# Patient Record
Sex: MALE | Race: WHITE | NOT HISPANIC OR LATINO | Employment: FULL TIME | URBAN - METROPOLITAN AREA
[De-identification: names, ages, dates, MRNs, and addresses within clinical notes are randomized per-mention and may not be internally consistent; named-entity substitution may affect disease eponyms.]

---

## 2024-01-17 ENCOUNTER — APPOINTMENT (OUTPATIENT)
Dept: RADIOLOGY | Facility: HOSPITAL | Age: 49
End: 2024-01-17
Payer: COMMERCIAL

## 2024-01-17 ENCOUNTER — APPOINTMENT (OUTPATIENT)
Dept: CARDIOLOGY | Facility: HOSPITAL | Age: 49
End: 2024-01-17
Payer: COMMERCIAL

## 2024-01-17 ENCOUNTER — HOSPITAL ENCOUNTER (OUTPATIENT)
Facility: HOSPITAL | Age: 49
Setting detail: OBSERVATION
Discharge: HOME | End: 2024-01-18
Attending: GENERAL PRACTICE | Admitting: INTERNAL MEDICINE
Payer: COMMERCIAL

## 2024-01-17 DIAGNOSIS — R09.02 HYPOXIA: Primary | ICD-10-CM

## 2024-01-17 DIAGNOSIS — J18.9 PNEUMONIA OF BOTH LUNGS DUE TO INFECTIOUS ORGANISM, UNSPECIFIED PART OF LUNG: ICD-10-CM

## 2024-01-17 PROBLEM — F10.90 ALCOHOL USE DISORDER: Status: ACTIVE | Noted: 2024-01-17

## 2024-01-17 PROBLEM — B34.9 NONSPECIFIC SYNDROME SUGGESTIVE OF VIRAL ILLNESS: Status: ACTIVE | Noted: 2024-01-17

## 2024-01-17 PROBLEM — J96.01 ACUTE RESPIRATORY FAILURE WITH HYPOXIA (MULTI): Status: ACTIVE | Noted: 2024-01-17

## 2024-01-17 PROBLEM — R05.9 COUGH: Status: ACTIVE | Noted: 2024-01-17

## 2024-01-17 LAB
ALBUMIN SERPL BCP-MCNC: 4.1 G/DL (ref 3.4–5)
ALP SERPL-CCNC: 70 U/L (ref 33–120)
ALT SERPL W P-5'-P-CCNC: 20 U/L (ref 10–52)
ANION GAP SERPL CALC-SCNC: 15 MMOL/L (ref 10–20)
AST SERPL W P-5'-P-CCNC: 15 U/L (ref 9–39)
BASOPHILS # BLD AUTO: 0.03 X10*3/UL (ref 0–0.1)
BASOPHILS NFR BLD AUTO: 0.3 %
BILIRUB SERPL-MCNC: 1.3 MG/DL (ref 0–1.2)
BNP SERPL-MCNC: 30 PG/ML (ref 0–99)
BUN SERPL-MCNC: 8 MG/DL (ref 6–23)
CALCIUM SERPL-MCNC: 9.3 MG/DL (ref 8.6–10.3)
CARDIAC TROPONIN I PNL SERPL HS: <3 NG/L (ref 0–20)
CHLORIDE SERPL-SCNC: 103 MMOL/L (ref 98–107)
CO2 SERPL-SCNC: 25 MMOL/L (ref 21–32)
CREAT SERPL-MCNC: 0.86 MG/DL (ref 0.5–1.3)
D DIMER PPP FEU-MCNC: 342 NG/ML FEU
EGFRCR SERPLBLD CKD-EPI 2021: >90 ML/MIN/1.73M*2
EOSINOPHIL # BLD AUTO: 0.25 X10*3/UL (ref 0–0.7)
EOSINOPHIL NFR BLD AUTO: 2.8 %
ERYTHROCYTE [DISTWIDTH] IN BLOOD BY AUTOMATED COUNT: 12.8 % (ref 11.5–14.5)
ETHANOL SERPL-MCNC: <10 MG/DL
FLUAV RNA RESP QL NAA+PROBE: NOT DETECTED
FLUBV RNA RESP QL NAA+PROBE: NOT DETECTED
GLUCOSE SERPL-MCNC: 131 MG/DL (ref 74–99)
HCT VFR BLD AUTO: 46.5 % (ref 41–52)
HGB BLD-MCNC: 15 G/DL (ref 13.5–17.5)
IMM GRANULOCYTES # BLD AUTO: 0.01 X10*3/UL (ref 0–0.7)
IMM GRANULOCYTES NFR BLD AUTO: 0.1 % (ref 0–0.9)
LYMPHOCYTES # BLD AUTO: 2.1 X10*3/UL (ref 1.2–4.8)
LYMPHOCYTES NFR BLD AUTO: 23.9 %
MAGNESIUM SERPL-MCNC: 2.4 MG/DL (ref 1.6–2.4)
MCH RBC QN AUTO: 28.5 PG (ref 26–34)
MCHC RBC AUTO-ENTMCNC: 32.3 G/DL (ref 32–36)
MCV RBC AUTO: 88 FL (ref 80–100)
MONOCYTES # BLD AUTO: 0.95 X10*3/UL (ref 0.1–1)
MONOCYTES NFR BLD AUTO: 10.8 %
NEUTROPHILS # BLD AUTO: 5.46 X10*3/UL (ref 1.2–7.7)
NEUTROPHILS NFR BLD AUTO: 62.1 %
NRBC BLD-RTO: 0 /100 WBCS (ref 0–0)
PLATELET # BLD AUTO: 242 X10*3/UL (ref 150–450)
POTASSIUM SERPL-SCNC: 3.6 MMOL/L (ref 3.5–5.3)
PROT SERPL-MCNC: 7.5 G/DL (ref 6.4–8.2)
RBC # BLD AUTO: 5.26 X10*6/UL (ref 4.5–5.9)
RSV RNA RESP QL NAA+PROBE: NOT DETECTED
SARS-COV-2 RNA RESP QL NAA+PROBE: NOT DETECTED
SODIUM SERPL-SCNC: 139 MMOL/L (ref 136–145)
WBC # BLD AUTO: 8.8 X10*3/UL (ref 4.4–11.3)

## 2024-01-17 PROCEDURE — 85025 COMPLETE CBC W/AUTO DIFF WBC: CPT | Performed by: PHYSICIAN ASSISTANT

## 2024-01-17 PROCEDURE — 96375 TX/PRO/DX INJ NEW DRUG ADDON: CPT

## 2024-01-17 PROCEDURE — 71046 X-RAY EXAM CHEST 2 VIEWS: CPT | Performed by: RADIOLOGY

## 2024-01-17 PROCEDURE — 2500000002 HC RX 250 W HCPCS SELF ADMINISTERED DRUGS (ALT 637 FOR MEDICARE OP, ALT 636 FOR OP/ED): Performed by: PHYSICIAN ASSISTANT

## 2024-01-17 PROCEDURE — 96365 THER/PROPH/DIAG IV INF INIT: CPT

## 2024-01-17 PROCEDURE — 83880 ASSAY OF NATRIURETIC PEPTIDE: CPT | Performed by: PHYSICIAN ASSISTANT

## 2024-01-17 PROCEDURE — G0378 HOSPITAL OBSERVATION PER HR: HCPCS

## 2024-01-17 PROCEDURE — 2500000004 HC RX 250 GENERAL PHARMACY W/ HCPCS (ALT 636 FOR OP/ED): Performed by: PHYSICIAN ASSISTANT

## 2024-01-17 PROCEDURE — 80053 COMPREHEN METABOLIC PANEL: CPT | Performed by: PHYSICIAN ASSISTANT

## 2024-01-17 PROCEDURE — 85379 FIBRIN DEGRADATION QUANT: CPT | Performed by: PHYSICIAN ASSISTANT

## 2024-01-17 PROCEDURE — 84484 ASSAY OF TROPONIN QUANT: CPT | Performed by: PHYSICIAN ASSISTANT

## 2024-01-17 PROCEDURE — 99223 1ST HOSP IP/OBS HIGH 75: CPT | Performed by: PHYSICIAN ASSISTANT

## 2024-01-17 PROCEDURE — 87040 BLOOD CULTURE FOR BACTERIA: CPT | Mod: AHULAB | Performed by: PHYSICIAN ASSISTANT

## 2024-01-17 PROCEDURE — 71046 X-RAY EXAM CHEST 2 VIEWS: CPT

## 2024-01-17 PROCEDURE — 83735 ASSAY OF MAGNESIUM: CPT | Performed by: PHYSICIAN ASSISTANT

## 2024-01-17 PROCEDURE — 82077 ASSAY SPEC XCP UR&BREATH IA: CPT | Performed by: PHYSICIAN ASSISTANT

## 2024-01-17 PROCEDURE — 36415 COLL VENOUS BLD VENIPUNCTURE: CPT | Performed by: PHYSICIAN ASSISTANT

## 2024-01-17 PROCEDURE — 94640 AIRWAY INHALATION TREATMENT: CPT

## 2024-01-17 PROCEDURE — 94667 MNPJ CHEST WALL 1ST: CPT

## 2024-01-17 PROCEDURE — 93005 ELECTROCARDIOGRAM TRACING: CPT

## 2024-01-17 PROCEDURE — 96367 TX/PROPH/DG ADDL SEQ IV INF: CPT

## 2024-01-17 PROCEDURE — 99285 EMERGENCY DEPT VISIT HI MDM: CPT | Performed by: GENERAL PRACTICE

## 2024-01-17 PROCEDURE — 87637 SARSCOV2&INF A&B&RSV AMP PRB: CPT | Performed by: PHYSICIAN ASSISTANT

## 2024-01-17 PROCEDURE — 2500000005 HC RX 250 GENERAL PHARMACY W/O HCPCS: Performed by: PHYSICIAN ASSISTANT

## 2024-01-17 RX ORDER — ACETAMINOPHEN 160 MG/5ML
975 SOLUTION ORAL EVERY 6 HOURS PRN
Status: DISCONTINUED | OUTPATIENT
Start: 2024-01-17 | End: 2024-01-18 | Stop reason: HOSPADM

## 2024-01-17 RX ORDER — CEFTRIAXONE 1 G/50ML
1 INJECTION, SOLUTION INTRAVENOUS ONCE
Status: COMPLETED | OUTPATIENT
Start: 2024-01-17 | End: 2024-01-17

## 2024-01-17 RX ORDER — ENOXAPARIN SODIUM 100 MG/ML
60 INJECTION SUBCUTANEOUS EVERY 12 HOURS SCHEDULED
Status: DISCONTINUED | OUTPATIENT
Start: 2024-01-17 | End: 2024-01-18 | Stop reason: HOSPADM

## 2024-01-17 RX ORDER — IPRATROPIUM BROMIDE AND ALBUTEROL SULFATE 2.5; .5 MG/3ML; MG/3ML
3 SOLUTION RESPIRATORY (INHALATION) ONCE
Status: COMPLETED | OUTPATIENT
Start: 2024-01-17 | End: 2024-01-17

## 2024-01-17 RX ORDER — ACETAMINOPHEN 325 MG/1
950 TABLET ORAL EVERY 6 HOURS PRN
Status: DISCONTINUED | OUTPATIENT
Start: 2024-01-17 | End: 2024-01-18 | Stop reason: HOSPADM

## 2024-01-17 RX ORDER — IPRATROPIUM BROMIDE AND ALBUTEROL SULFATE 2.5; .5 MG/3ML; MG/3ML
3 SOLUTION RESPIRATORY (INHALATION)
Status: COMPLETED | OUTPATIENT
Start: 2024-01-17 | End: 2024-01-17

## 2024-01-17 RX ORDER — GUAIFENESIN/DEXTROMETHORPHAN 100-10MG/5
5 SYRUP ORAL EVERY 4 HOURS PRN
Status: DISCONTINUED | OUTPATIENT
Start: 2024-01-17 | End: 2024-01-18 | Stop reason: HOSPADM

## 2024-01-17 RX ORDER — TALC
3 POWDER (GRAM) TOPICAL
Status: DISCONTINUED | OUTPATIENT
Start: 2024-01-18 | End: 2024-01-18 | Stop reason: HOSPADM

## 2024-01-17 RX ORDER — ACETAMINOPHEN 650 MG/1
650 SUPPOSITORY RECTAL EVERY 4 HOURS PRN
Status: DISCONTINUED | OUTPATIENT
Start: 2024-01-17 | End: 2024-01-18 | Stop reason: HOSPADM

## 2024-01-17 RX ORDER — MAGNESIUM SULFATE HEPTAHYDRATE 40 MG/ML
2 INJECTION, SOLUTION INTRAVENOUS ONCE
Status: COMPLETED | OUTPATIENT
Start: 2024-01-17 | End: 2024-01-17

## 2024-01-17 RX ORDER — PANTOPRAZOLE SODIUM 40 MG/1
40 TABLET, DELAYED RELEASE ORAL
Status: DISCONTINUED | OUTPATIENT
Start: 2024-01-18 | End: 2024-01-18 | Stop reason: HOSPADM

## 2024-01-17 RX ORDER — DOCUSATE SODIUM 100 MG/1
100 CAPSULE, LIQUID FILLED ORAL 2 TIMES DAILY PRN
Status: DISCONTINUED | OUTPATIENT
Start: 2024-01-17 | End: 2024-01-18 | Stop reason: HOSPADM

## 2024-01-17 RX ORDER — PANTOPRAZOLE SODIUM 40 MG/10ML
40 INJECTION, POWDER, LYOPHILIZED, FOR SOLUTION INTRAVENOUS
Status: DISCONTINUED | OUTPATIENT
Start: 2024-01-18 | End: 2024-01-18 | Stop reason: HOSPADM

## 2024-01-17 RX ORDER — IPRATROPIUM BROMIDE AND ALBUTEROL SULFATE 2.5; .5 MG/3ML; MG/3ML
3 SOLUTION RESPIRATORY (INHALATION) EVERY 4 HOURS PRN
Status: DISCONTINUED | OUTPATIENT
Start: 2024-01-17 | End: 2024-01-18 | Stop reason: HOSPADM

## 2024-01-17 RX ORDER — GUAIFENESIN 600 MG/1
600 TABLET, EXTENDED RELEASE ORAL 2 TIMES DAILY
Status: DISCONTINUED | OUTPATIENT
Start: 2024-01-17 | End: 2024-01-18 | Stop reason: HOSPADM

## 2024-01-17 RX ADMIN — CEFTRIAXONE SODIUM 1 G: 1 INJECTION, SOLUTION INTRAVENOUS at 16:33

## 2024-01-17 RX ADMIN — ENOXAPARIN SODIUM 60 MG: 60 INJECTION SUBCUTANEOUS at 21:09

## 2024-01-17 RX ADMIN — IPRATROPIUM BROMIDE AND ALBUTEROL SULFATE 3 ML: 2.5; .5 SOLUTION RESPIRATORY (INHALATION) at 15:19

## 2024-01-17 RX ADMIN — Medication 2 L/MIN: at 21:07

## 2024-01-17 RX ADMIN — IPRATROPIUM BROMIDE AND ALBUTEROL SULFATE 3 ML: 2.5; .5 SOLUTION RESPIRATORY (INHALATION) at 15:25

## 2024-01-17 RX ADMIN — IPRATROPIUM BROMIDE AND ALBUTEROL SULFATE 3 ML: 2.5; .5 SOLUTION RESPIRATORY (INHALATION) at 17:17

## 2024-01-17 RX ADMIN — MAGNESIUM SULFATE IN WATER 2 G: 2 INJECTION, SOLUTION INTRAVENOUS at 17:02

## 2024-01-17 RX ADMIN — IPRATROPIUM BROMIDE AND ALBUTEROL SULFATE 3 ML: 2.5; .5 SOLUTION RESPIRATORY (INHALATION) at 15:21

## 2024-01-17 RX ADMIN — AZITHROMYCIN MONOHYDRATE 500 MG: 500 INJECTION, POWDER, LYOPHILIZED, FOR SOLUTION INTRAVENOUS at 17:46

## 2024-01-17 RX ADMIN — METHYLPREDNISOLONE SODIUM SUCCINATE 125 MG: 125 INJECTION, POWDER, FOR SOLUTION INTRAMUSCULAR; INTRAVENOUS at 15:10

## 2024-01-17 SDOH — SOCIAL STABILITY: SOCIAL INSECURITY: ARE THERE ANY APPARENT SIGNS OF INJURIES/BEHAVIORS THAT COULD BE RELATED TO ABUSE/NEGLECT?: NO

## 2024-01-17 SDOH — SOCIAL STABILITY: SOCIAL INSECURITY: DO YOU FEEL UNSAFE GOING BACK TO THE PLACE WHERE YOU ARE LIVING?: NO

## 2024-01-17 SDOH — SOCIAL STABILITY: SOCIAL INSECURITY: DOES ANYONE TRY TO KEEP YOU FROM HAVING/CONTACTING OTHER FRIENDS OR DOING THINGS OUTSIDE YOUR HOME?: NO

## 2024-01-17 SDOH — SOCIAL STABILITY: SOCIAL INSECURITY: WERE YOU ABLE TO COMPLETE ALL THE BEHAVIORAL HEALTH SCREENINGS?: YES

## 2024-01-17 SDOH — SOCIAL STABILITY: SOCIAL INSECURITY: HAS ANYONE EVER THREATENED TO HURT YOUR FAMILY OR YOUR PETS?: NO

## 2024-01-17 SDOH — SOCIAL STABILITY: SOCIAL INSECURITY: ABUSE: ADULT

## 2024-01-17 SDOH — SOCIAL STABILITY: SOCIAL INSECURITY: DO YOU FEEL ANYONE HAS EXPLOITED OR TAKEN ADVANTAGE OF YOU FINANCIALLY OR OF YOUR PERSONAL PROPERTY?: NO

## 2024-01-17 SDOH — SOCIAL STABILITY: SOCIAL INSECURITY: HAVE YOU HAD THOUGHTS OF HARMING ANYONE ELSE?: NO

## 2024-01-17 SDOH — SOCIAL STABILITY: SOCIAL INSECURITY: ARE YOU OR HAVE YOU BEEN THREATENED OR ABUSED PHYSICALLY, EMOTIONALLY, OR SEXUALLY BY ANYONE?: NO

## 2024-01-17 ASSESSMENT — ACTIVITIES OF DAILY LIVING (ADL)
HEARING - RIGHT EAR: FUNCTIONAL
PATIENT'S MEMORY ADEQUATE TO SAFELY COMPLETE DAILY ACTIVITIES?: YES
HEARING - LEFT EAR: FUNCTIONAL
TOILETING: INDEPENDENT
BATHING: INDEPENDENT
DRESSING YOURSELF: INDEPENDENT
ADEQUATE_TO_COMPLETE_ADL: YES
FEEDING YOURSELF: INDEPENDENT
JUDGMENT_ADEQUATE_SAFELY_COMPLETE_DAILY_ACTIVITIES: YES
LACK_OF_TRANSPORTATION: NO
WALKS IN HOME: INDEPENDENT
ASSISTIVE_DEVICE: EYEGLASSES
GROOMING: INDEPENDENT

## 2024-01-17 ASSESSMENT — PATIENT HEALTH QUESTIONNAIRE - PHQ9
1. LITTLE INTEREST OR PLEASURE IN DOING THINGS: NOT AT ALL
2. FEELING DOWN, DEPRESSED OR HOPELESS: NOT AT ALL
SUM OF ALL RESPONSES TO PHQ9 QUESTIONS 1 & 2: 0

## 2024-01-17 ASSESSMENT — ENCOUNTER SYMPTOMS
CHILLS: 0
DIARRHEA: 0
MYALGIAS: 0
DYSURIA: 0
HEMATURIA: 0
VOMITING: 0
CONSTIPATION: 0
WHEEZING: 1
SHORTNESS OF BREATH: 1
FEVER: 0
SORE THROAT: 1
COUGH: 1
CHEST TIGHTNESS: 0
CONFUSION: 0
NAUSEA: 0
HALLUCINATIONS: 0
DIZZINESS: 0
EYE PAIN: 0
HEADACHES: 1

## 2024-01-17 ASSESSMENT — LIFESTYLE VARIABLES
HAVE YOU OR SOMEONE ELSE BEEN INJURED AS A RESULT OF YOUR DRINKING: NO
AUDIT-C TOTAL SCORE: 10
HOW OFTEN DURING THE LAST YEAR HAVE YOU NEEDED AN ALCOHOLIC DRINK FIRST THING IN THE MORNING TO GET YOURSELF GOING AFTER A NIGHT OF HEAVY DRINKING: NEVER
SKIP TO QUESTIONS 9-10: 0
HAS A RELATIVE, FRIEND, DOCTOR, OR ANOTHER HEALTH PROFESSIONAL EXPRESSED CONCERN ABOUT YOUR DRINKING OR SUGGESTED YOU CUT DOWN: YES, BUT NOT IN THE LAST YEAR
PRESCIPTION_ABUSE_PAST_12_MONTHS: NO
HOW OFTEN DURING THE LAST YEAR HAVE YOU HAD A FEELING OF GUILT OR REMORSE AFTER DRINKING: NEVER
HOW OFTEN DO YOU HAVE 6 OR MORE DRINKS ON ONE OCCASION: WEEKLY
AUDIT TOTAL SCORE: -1
AUDIT TOTAL SCORE: 2
AUDIT-C TOTAL SCORE: 10
HOW OFTEN DURING THE LAST YEAR HAVE YOU BEEN UNABLE TO REMEMBER WHAT HAPPENED THE NIGHT BEFORE BECAUSE YOU HAD BEEN DRINKING: NEVER
HOW MANY STANDARD DRINKS CONTAINING ALCOHOL DO YOU HAVE ON A TYPICAL DAY: 7 TO 9
SUBSTANCE_ABUSE_PAST_12_MONTHS: NO
HOW OFTEN DO YOU HAVE A DRINK CONTAINING ALCOHOL: 4 OR MORE TIMES A WEEK
HOW OFTEN DURING THE LAST YEAR HAVE YOU FAILED TO DO WHAT WAS NORMALLY EXPECTED FROM YOU BECAUSE OF DRINKING: NEVER

## 2024-01-17 ASSESSMENT — COGNITIVE AND FUNCTIONAL STATUS - GENERAL
PATIENT BASELINE BEDBOUND: NO
MOBILITY SCORE: 24
DAILY ACTIVITIY SCORE: 24

## 2024-01-17 ASSESSMENT — COLUMBIA-SUICIDE SEVERITY RATING SCALE - C-SSRS
2. HAVE YOU ACTUALLY HAD ANY THOUGHTS OF KILLING YOURSELF?: NO
1. IN THE PAST MONTH, HAVE YOU WISHED YOU WERE DEAD OR WISHED YOU COULD GO TO SLEEP AND NOT WAKE UP?: NO
6. HAVE YOU EVER DONE ANYTHING, STARTED TO DO ANYTHING, OR PREPARED TO DO ANYTHING TO END YOUR LIFE?: NO

## 2024-01-17 ASSESSMENT — PAIN - FUNCTIONAL ASSESSMENT: PAIN_FUNCTIONAL_ASSESSMENT: 0-10

## 2024-01-17 ASSESSMENT — PAIN SCALES - GENERAL: PAINLEVEL_OUTOF10: 0 - NO PAIN

## 2024-01-17 NOTE — H&P
History Of Present Illness  Rob Carlson is a 48 y.o. male with PMH significant for THU, alcohol use disorder, obesity, venous insufficiency with chronic ulcers who presented to the ED with cough, sore throat and flulike symptoms for the past 4 days. He also reports SOB for the past month and states he can only walk about 60 ft before getting short of breath.  Denies any fever, chills, CP, N/V/D, dizziness. He admits to heavy alcohol use daily. Denies h/o smoking or illicit drug use. Denies h/o DVT/PE, CVA/TIA, acute MI.     ED course: EKG showed NSR, no ischemic ST changes. Negative COVID/Influenza/RSV PCR. Lab work showed CBC without leukocytosis, CMP unremarkable. D-dimer normal. HS troponin normal. Blood cultures pending. CXR showed diffuse interstitial infiltrates bilaterally. Received IV Solu-medrol and DuoNebs x3 with improvement in his symptoms but continued wheezing heard on exam. Given IV azithromycin 500mg and IV ceftriaxone 1g for suspected pneumonia. Pt unable to maintain adequate oxygen saturations on room air and was admitted to observation.       Past Medical History  History reviewed. No pertinent past medical history.    Surgical History  History reviewed. No pertinent surgical history.    Social History  Social History     Socioeconomic History    Marital status: Single     Spouse name: None    Number of children: None    Years of education: None    Highest education level: None   Occupational History    None   Tobacco Use    Smoking status: Never    Smokeless tobacco: Never   Vaping Use    Vaping Use: None   Substance and Sexual Activity    Alcohol use: Yes     Alcohol/week: 6.0 standard drinks of alcohol     Types: 6 Cans of beer per week     Comment: 5th on fri and sat    Drug use: Never    Sexual activity: None   Other Topics Concern    None   Social History Narrative    None     Social Determinants of Health     Financial Resource Strain: Low Risk  (1/17/2024)    Overall Financial Resource  Strain (CARDIA)     Difficulty of Paying Living Expenses: Not hard at all   Food Insecurity: Not on file   Transportation Needs: No Transportation Needs (1/17/2024)    PRAPARE - Transportation     Lack of Transportation (Medical): No     Lack of Transportation (Non-Medical): No   Physical Activity: Not on file   Stress: Not on file   Social Connections: Not on file   Intimate Partner Violence: Not on file   Housing Stability: Low Risk  (1/17/2024)    Housing Stability Vital Sign     Unable to Pay for Housing in the Last Year: No     Number of Places Lived in the Last Year: 1     Unstable Housing in the Last Year: No        Family History  No family history on file.     Allergies  Allergies as of 01/17/2024 - Reviewed 01/17/2024   Allergen Reaction Noted    Bactrim [sulfamethoxazole-trimethoprim] Rash 01/17/2024        Review of Systems  Review of Systems   Constitutional:  Negative for chills and fever.   HENT:  Positive for sneezing and sore throat.    Eyes:  Negative for pain.   Respiratory:  Positive for cough, shortness of breath and wheezing. Negative for chest tightness.    Cardiovascular:  Positive for leg swelling. Negative for chest pain.   Gastrointestinal:  Negative for constipation, diarrhea, nausea and vomiting.   Genitourinary:  Negative for dysuria and hematuria.   Musculoskeletal:  Negative for myalgias.   Skin:  Negative for rash.   Neurological:  Positive for headaches. Negative for dizziness.   Psychiatric/Behavioral:  Negative for confusion and hallucinations.        Relevant results reviewed   PROVIDER notes  Nursing notes  MEDS:  Current Facility-Administered Medications   Medication Dose Route Frequency Provider Last Rate Last Admin    acetaminophen (Tylenol) tablet 975 mg  975 mg oral q6h PRN Sil Horner PA-C        Or    acetaminophen (Tylenol) oral liquid 1,000 mg  1,000 mg oral q6h PRN Sil Horner PA-C        Or    acetaminophen (Tylenol) suppository 650 mg  650 mg rectal q4h PRN  Sil Horner PA-C        azithromycin (Zithromax) in dextrose 5 % in water (D5W) 250 mL  mg  500 mg intravenous q24h Sil Horner PA-C        cefTRIAXone (Rocephin) in dextrose 5 % water (D5W) 50 mL IV 2 g  2 g intravenous q24h Sli Horner PA-C        dextromethorphan-guaifenesin (Robitussin DM)  mg/5 mL oral liquid 5 mL  5 mL oral q4h PRN Sil Horner PA-C        docusate sodium (Colace) capsule 100 mg  100 mg oral BID PRN Sil Horner PA-C        enoxaparin (Lovenox) syringe 60 mg  60 mg subcutaneous q12h BRENT Sil Horner PA-C   60 mg at 01/17/24 2109    guaiFENesin (Mucinex) 12 hr tablet 600 mg  600 mg oral BID Sil Horner PA-C        ipratropium-albuteroL (Duo-Neb) 0.5-2.5 mg/3 mL nebulizer solution 3 mL  3 mL nebulization q4h PRN Sil Horner PA-C        melatonin tablet 3 mg  3 mg oral Daily Sil Horner PA-C        oxygen (O2) therapy   inhalation Continuous PRN - O2/gases Anu Valdivia PA-C   2 L/min at 01/17/24 2107    pantoprazole (ProtoNix) EC tablet 40 mg  40 mg oral Daily before breakfast Sil Horner PA-C        Or    pantoprazole (ProtoNix) injection 40 mg  40 mg intravenous Daily before breakfast Sil Horner PA-C          LABS:  Results for orders placed or performed during the hospital encounter of 01/17/24 (from the past 24 hour(s))   Sars-CoV-2 and Influenza A/B PCR   Result Value Ref Range    Flu A Result Not Detected Not Detected    Flu B Result Not Detected Not Detected    Coronavirus 2019, PCR Not Detected Not Detected   RSV PCR   Result Value Ref Range    RSV PCR Not Detected Not Detected   ECG 12 Lead   Result Value Ref Range    Ventricular Rate 90 BPM    Atrial Rate 90 BPM    FL Interval 134 ms    QRS Duration 82 ms    QT Interval 370 ms    QTC Calculation(Bazett) 452 ms    P Axis 39 degrees    R Axis 37 degrees    T Axis -14 degrees    QRS Count 15 beats    Q Onset 218 ms    P Onset 151 ms    P Offset 201 ms    T Offset 403 ms     QTC Fredericia 423 ms   CBC and Auto Differential   Result Value Ref Range    WBC 8.8 4.4 - 11.3 x10*3/uL    nRBC 0.0 0.0 - 0.0 /100 WBCs    RBC 5.26 4.50 - 5.90 x10*6/uL    Hemoglobin 15.0 13.5 - 17.5 g/dL    Hematocrit 46.5 41.0 - 52.0 %    MCV 88 80 - 100 fL    MCH 28.5 26.0 - 34.0 pg    MCHC 32.3 32.0 - 36.0 g/dL    RDW 12.8 11.5 - 14.5 %    Platelets 242 150 - 450 x10*3/uL    Neutrophils % 62.1 40.0 - 80.0 %    Immature Granulocytes %, Automated 0.1 0.0 - 0.9 %    Lymphocytes % 23.9 13.0 - 44.0 %    Monocytes % 10.8 2.0 - 10.0 %    Eosinophils % 2.8 0.0 - 6.0 %    Basophils % 0.3 0.0 - 2.0 %    Neutrophils Absolute 5.46 1.20 - 7.70 x10*3/uL    Immature Granulocytes Absolute, Automated 0.01 0.00 - 0.70 x10*3/uL    Lymphocytes Absolute 2.10 1.20 - 4.80 x10*3/uL    Monocytes Absolute 0.95 0.10 - 1.00 x10*3/uL    Eosinophils Absolute 0.25 0.00 - 0.70 x10*3/uL    Basophils Absolute 0.03 0.00 - 0.10 x10*3/uL   Comprehensive metabolic panel   Result Value Ref Range    Glucose 131 (H) 74 - 99 mg/dL    Sodium 139 136 - 145 mmol/L    Potassium 3.6 3.5 - 5.3 mmol/L    Chloride 103 98 - 107 mmol/L    Bicarbonate 25 21 - 32 mmol/L    Anion Gap 15 10 - 20 mmol/L    Urea Nitrogen 8 6 - 23 mg/dL    Creatinine 0.86 0.50 - 1.30 mg/dL    eGFR >90 >60 mL/min/1.73m*2    Calcium 9.3 8.6 - 10.3 mg/dL    Albumin 4.1 3.4 - 5.0 g/dL    Alkaline Phosphatase 70 33 - 120 U/L    Total Protein 7.5 6.4 - 8.2 g/dL    AST 15 9 - 39 U/L    Bilirubin, Total 1.3 (H) 0.0 - 1.2 mg/dL    ALT 20 10 - 52 U/L   D-dimer, VTE Exclusion   Result Value Ref Range    D-Dimer, Quantitative VTE Exclusion 342 <=500 ng/mL FEU   Blood Culture    Specimen: Peripheral Venipuncture; Blood culture   Result Value Ref Range    Blood Culture Loaded on Instrument - Culture in progress    Blood Culture    Specimen: Peripheral Venipuncture; Blood culture   Result Value Ref Range    Blood Culture Loaded on Instrument - Culture in progress    B-Type Natriuretic Peptide    Result Value Ref Range    BNP 30 0 - 99 pg/mL   Ethanol   Result Value Ref Range    Alcohol <10 <=10 mg/dL   Troponin I, High Sensitivity   Result Value Ref Range    Troponin I, High Sensitivity <3 0 - 20 ng/L   Magnesium   Result Value Ref Range    Magnesium 2.40 1.60 - 2.40 mg/dL      IMAGING:  XR chest 2 views   Final Result   Diffuse interstitial infiltrates bilaterally, as above. Clinical   correlation and continued follow-up until clearing is recommended.        MACRO:   None.        Signed by: Young Conway 1/17/2024 3:14 PM   Dictation workstation:   [id number]             PHYSICAL EXAM  /84 (BP Location: Right arm, Patient Position: Lying)   Pulse 76   Temp 36.5 °C (97.7 °F) (Oral)   Resp 15   Ht 1.829 m (6')   Wt (!) 186 kg (410 lb)   SpO2 95%   BMI 55.61 kg/m²   PHYSICAL EXAM:  GENERAL: Alert, NAD, cooperative  SKIN: Warm and dry. Chronic venous ulcers present on anterior shins bilaterally - wounds are dressed and were not removed during exam.  HEENT:  NCAT, PERRLA, EOMI, nonicteric sclera, MMM, neck supple, trachea midline  LUNGS: Diffuse expiratory wheezing  CARDS: RRR, no m/g/r appreciated  GI: Soft, NTND, BS+, no rebound, no guarding   MS/Extremities: WWP, 3+ pitting edema bilaterally, distal pulses intact, moves all extremities  NEURO: A&Ox3, grossly nonfocal exam, speech fluent, follows commands, answers questions appropriately  PSYCH: mood and behavior appropriate    Assessment/Plan:   Principal Problem:    Pneumonia of both lungs due to infectious organism, unspecified part of lung  Active Problems:    Acute respiratory failure with hypoxia (CMS/HCC)    Alcohol use disorder     Pneumonia  Hypoxia  -CBC: no leukocytosis, no acute anemia, no thrombocytopenia  -CMP: no acute electrolyte abnormalities, no acute renal or liver dysfunction appreciated -HS CHIDI: normal   -BNP: normal  -COVID: negative  -Flu A/B: negative  -RSV: negative  -CXR: Diffuse interstitial infiltrates bilaterally    -continue Rocephin/Azithro to cover CAP  -Procalcitonin pending  -BPH, duonebs, anti-tussive, anti-mucolytics, RT consult  -Titrate Oxygen to maintain O2 > 90%  -Blood culture pending - preliminary results negative  -urine legionella and strep pending  -steroids   -appreciate pulm consult  -resume home meds as appropriate     Alcohol use disorder  -Ethanol: normal  -Urine tox screen: pending    GI prophylaxis  -Protonix    VTE prophylaxis  - Lovenox    Rita Ramos  Not finalized until co-signed    I was present with a PA student who participated in the documentation of this note. I personally saw and evaluated the patient and performed my own history and examination.  I discussed the case with the PA student. I have reviewed, verified, and revised the note as necessary and agree with the content and plan as written by the PA student, addendums noted in above in bold as appropriate.     Sil Horner PA-C    Total time spent [including but not limited to]: Obtaining and reviewing patient medical records/history, obtaining a separate history,  examining and assessing the patient, providing  and education, placing pertinent orders for labs/tests/medications,  communicating with the patient/family/health team, documenting in the patient's EMR/formulation of this note, independently interpreting results and data, coordinating care:   55 minutes, with greater than 50% spent in personal discussion with patient and/or family

## 2024-01-17 NOTE — ED NOTES
Pt to room 03, placed on cardiac monitor with cycling BP's and continuous pulse oximetry. Pt room air saturation 92% he was placed on 2L/Nc at this time.      Maicol Moya RN  01/17/24 1708

## 2024-01-17 NOTE — ED PROVIDER NOTES
Chief Complaint   Patient presents with    Flu Symptoms     Patient has flue like symptoms since xmas, cold air makes it worse, given duoneb en route     HPI:   Rob Carlson is an 48 y.o. m with history of untreated THU, MDD, alcohol use disorder, obesity and chronic bilateral lower extremity edema presents to the ED via EMS for evaluation of cough and flulike symptoms since before Christmas.  Per EMS, he was wheezing and they gave 1 DuoNeb and route.  Patient denies history of asthma.  He says he has been feeling ill since before Christmas however over the past few days symptoms have gotten significantly worse.  He endorses shortness of breath when supine however he is not short of breath when talking, sitting erect or moving.  He endorses cough productive of white cloudy sputum, generalized headache, subjective fever.  He has been taking DayQuil, NyQuil and Robitussin which does help.  Denies sick contacts.  Patient denies abdominal pain, nausea, vomiting, dysuria, hemoptysis, palpitations, chest pain, dizziness, syncope.  Drinks alcohol daily.    Medications: None  Soc HX: Daily alcohol use.  Denies tobacco and drug use.  Allergies   Allergen Reactions    Bactrim [Sulfamethoxazole-Trimethoprim] Rash   : Bactrim  History reviewed. No pertinent past medical history.  History reviewed. No pertinent surgical history.  No family history on file.     Physical Exam  Vitals and nursing note reviewed.   Constitutional:       General: He is not in acute distress.     Appearance: He is not ill-appearing or toxic-appearing.      Comments: Significantly elevated BMI   HENT:      Right Ear: External ear normal.      Left Ear: External ear normal.      Nose: Congestion present. No rhinorrhea.      Mouth/Throat:      Mouth: Mucous membranes are dry.      Pharynx: Posterior oropharyngeal erythema present. No oropharyngeal exudate.   Eyes:      Pupils: Pupils are equal, round, and reactive to light.   Neck:      Comments:  Difficult to assess adenopathy secondary to body habitus  Cardiovascular:      Rate and Rhythm: Regular rhythm. Tachycardia present.      Pulses: Normal pulses.      Heart sounds: No murmur heard.     Comments: Right calf circumference 50 cm.  Left calf circumference 56 cm.  Negative Hortencia bilaterally  Pulmonary:      Effort: Pulmonary effort is normal. No respiratory distress.      Breath sounds: Wheezing present.      Comments: Breath sounds diminished bilateral bases.  Diffuse expiratory wheezing.  Abdominal:      General: Bowel sounds are normal.      Palpations: Abdomen is soft.      Tenderness: There is no abdominal tenderness. There is no guarding or rebound.   Musculoskeletal:         General: No deformity or signs of injury. Normal range of motion.      Cervical back: Normal range of motion.   Skin:     General: Skin is warm and dry.      Capillary Refill: Capillary refill takes less than 2 seconds.      Findings: No bruising or rash.   Neurological:      Mental Status: He is alert.      Cranial Nerves: No cranial nerve deficit.     VS: As documented in the triage note and EMR flowsheet from this visit were reviewed.    EKG INTERPRETATION:      Personally Reviewed      Rhythm:  Normal sinus rhythm      Rate:        Axis: Normal axis      Intervals:  Normal IA interval      QRS Complex:  Normal      ST Segment:  Normal ST-T segments      QT Interval:  Normal (QTc XXX ms)      Compared with Prior:  Unchanged       Medical Decision Making:   ED Course as of 01/17/24 1834   Wed Jan 17, 2024   1442 Vitals Reviewed: Afebrile. Hypertensive. Not tachycardic nor tachypneic. No hypoxia.   [KA]   1455 Patient is 48-year-old male who presents to the ED for evaluation of cough, wheezing and congestion.  He is morbidly obese.  Diffuse wheezing in all lung fields.  Tight with breath sounds diminished bilateral lung bases.  Right calf circumference 50 cm.  Left calf circumference 56 cm.  He said that this is not new and is  chronic.  Negative Hortencia bilaterally.  Abdomen is soft, nontender, nondistended.  Have ordered IV Solu-Medrol and DuoNebs x 3.  Will obtain COVID flu swab and chest x-ray. [KA]   1521 Patient reports feeling better after receiving IV Solu-Medrol.  Still with wheezing throughout. [KA]   1528 Radiology reads chest x-ray is mild interstitial infiltrates which could represent edema versus infectious etiology.  On reassessment wheezing has improved some following Solu-Medrol but he is still has wheezing in all lung fields, still diminished bilateral bases.  He is tachycardic.  Have ordered labs to include blood cultures, dimer to evaluate for possible PE. [KA]   1548 Viral swabs negative. [KA]   1641 On reauscultation, patient is still wheezing.  He says he does feel better following the breathing treatments.  Will order 2 g IV mag.  Dimer negative.  CBC without abnormalities.  CMP without significant abnormalities. [KA]   1714 Called lab.  They are going to run troponin now. [KA]   1741 Patient is 94% even on 2 L nasal cannula.  He has never required oxygen in the past. [KA]   1750 On reassessment, patient still has diffuse expiratory wheezing in the lower lobes.  I went into the room and he is 95% on 2 L.  Of asked RN to trial taking patient off O2.  Falls around 92 to 94%. [KA]   1805 RN notified me that patient dropped to 90% on room air.  Patient will necessitate admission.  I discussed this with him.  He is agreeable. [KA]   1818 Troponin normal. [KA]   1832 Discussed patient's case with  RN.  She states that observation is the appropriate disposition because he does not meet his insurance's requirement for inpatient.  To be considered for inpatient he would have to be 89% on room air or less.  She said he can be reassessed tomorrow to see if he qualifies for inpatient otherwise he will remain observation. [KA]      ED Course User Index  [KA] Anu Valdivia PA-C         Diagnoses as of 01/17/24 1834   Hypoxia    Pneumonia of both lungs due to infectious organism, unspecified part of lung      Escalation of Care: Appropriate for hospitalization       Discussion of Management with Other Providers:  I discussed the patient/results with: Attending Mariaelena    Counseling: Spoke with the patient and discussed today´s findings, in addition to providing specific details for the plan of care and expected course.  Patient was given the opportunity to ask questions.  Educated on the common potential side effects of medications prescribed.    The plan of care was mutually agreed upon with the patient. The patient and/or family were given the opportunity to ask questions. All questions asked today in the ED were answered to the best of my ability with today's information.    This patient was cared for in the setting of nationwide stress on resources and staffing.    This report was transcribed using voice recognition software.  Every effort was made to ensure accuracy, however, inadvertently computerized transcription errors may be present.       Anu Valdivia PA-C  01/17/24 6829

## 2024-01-18 ENCOUNTER — PHARMACY VISIT (OUTPATIENT)
Dept: PHARMACY | Facility: CLINIC | Age: 49
End: 2024-01-18
Payer: COMMERCIAL

## 2024-01-18 VITALS
DIASTOLIC BLOOD PRESSURE: 78 MMHG | BODY MASS INDEX: 42.66 KG/M2 | WEIGHT: 315 LBS | RESPIRATION RATE: 18 BRPM | OXYGEN SATURATION: 100 % | SYSTOLIC BLOOD PRESSURE: 136 MMHG | HEIGHT: 72 IN | HEART RATE: 78 BPM | TEMPERATURE: 97.7 F

## 2024-01-18 LAB
AMPHETAMINES UR QL SCN: NORMAL
ANION GAP SERPL CALC-SCNC: 14 MMOL/L (ref 10–20)
ATRIAL RATE: 90 BPM
BARBITURATES UR QL SCN: NORMAL
BENZODIAZ UR QL SCN: NORMAL
BUN SERPL-MCNC: 11 MG/DL (ref 6–23)
BZE UR QL SCN: NORMAL
CALCIUM SERPL-MCNC: 9.1 MG/DL (ref 8.6–10.3)
CANNABINOIDS UR QL SCN: NORMAL
CHLORIDE SERPL-SCNC: 105 MMOL/L (ref 98–107)
CO2 SERPL-SCNC: 25 MMOL/L (ref 21–32)
CREAT SERPL-MCNC: 0.73 MG/DL (ref 0.5–1.3)
EGFRCR SERPLBLD CKD-EPI 2021: >90 ML/MIN/1.73M*2
ERYTHROCYTE [DISTWIDTH] IN BLOOD BY AUTOMATED COUNT: 12.8 % (ref 11.5–14.5)
FENTANYL+NORFENTANYL UR QL SCN: NORMAL
GLUCOSE SERPL-MCNC: 152 MG/DL (ref 74–99)
HCT VFR BLD AUTO: 45.3 % (ref 41–52)
HGB BLD-MCNC: 14.7 G/DL (ref 13.5–17.5)
MCH RBC QN AUTO: 29.2 PG (ref 26–34)
MCHC RBC AUTO-ENTMCNC: 32.5 G/DL (ref 32–36)
MCV RBC AUTO: 90 FL (ref 80–100)
NRBC BLD-RTO: 0 /100 WBCS (ref 0–0)
OPIATES UR QL SCN: NORMAL
OXYCODONE+OXYMORPHONE UR QL SCN: NORMAL
P AXIS: 39 DEGREES
P OFFSET: 201 MS
P ONSET: 151 MS
PCP UR QL SCN: NORMAL
PLATELET # BLD AUTO: 258 X10*3/UL (ref 150–450)
POTASSIUM SERPL-SCNC: 4.5 MMOL/L (ref 3.5–5.3)
PR INTERVAL: 134 MS
PROCALCITONIN SERPL-MCNC: 0.04 NG/ML
Q ONSET: 218 MS
QRS COUNT: 15 BEATS
QRS DURATION: 82 MS
QT INTERVAL: 370 MS
QTC CALCULATION(BAZETT): 452 MS
QTC FREDERICIA: 423 MS
R AXIS: 37 DEGREES
RBC # BLD AUTO: 5.04 X10*6/UL (ref 4.5–5.9)
SODIUM SERPL-SCNC: 139 MMOL/L (ref 136–145)
T AXIS: -14 DEGREES
T OFFSET: 403 MS
VENTRICULAR RATE: 90 BPM
WBC # BLD AUTO: 5.4 X10*3/UL (ref 4.4–11.3)

## 2024-01-18 PROCEDURE — 99233 SBSQ HOSP IP/OBS HIGH 50: CPT | Performed by: NURSE PRACTITIONER

## 2024-01-18 PROCEDURE — 2500000002 HC RX 250 W HCPCS SELF ADMINISTERED DRUGS (ALT 637 FOR MEDICARE OP, ALT 636 FOR OP/ED): Mod: MUE | Performed by: PHYSICIAN ASSISTANT

## 2024-01-18 PROCEDURE — 36415 COLL VENOUS BLD VENIPUNCTURE: CPT | Performed by: PHYSICIAN ASSISTANT

## 2024-01-18 PROCEDURE — 2500000004 HC RX 250 GENERAL PHARMACY W/ HCPCS (ALT 636 FOR OP/ED): Performed by: NURSE PRACTITIONER

## 2024-01-18 PROCEDURE — 87899 AGENT NOS ASSAY W/OPTIC: CPT | Mod: AHULAB | Performed by: PHYSICIAN ASSISTANT

## 2024-01-18 PROCEDURE — 2500000005 HC RX 250 GENERAL PHARMACY W/O HCPCS: Performed by: PHYSICIAN ASSISTANT

## 2024-01-18 PROCEDURE — 87449 NOS EACH ORGANISM AG IA: CPT | Mod: AHULAB | Performed by: PHYSICIAN ASSISTANT

## 2024-01-18 PROCEDURE — 94668 MNPJ CHEST WALL SBSQ: CPT

## 2024-01-18 PROCEDURE — 94640 AIRWAY INHALATION TREATMENT: CPT

## 2024-01-18 PROCEDURE — 80307 DRUG TEST PRSMV CHEM ANLYZR: CPT | Performed by: PHYSICIAN ASSISTANT

## 2024-01-18 PROCEDURE — 84145 PROCALCITONIN (PCT): CPT | Mod: AHULAB | Performed by: PHYSICIAN ASSISTANT

## 2024-01-18 PROCEDURE — 84520 ASSAY OF UREA NITROGEN: CPT | Performed by: PHYSICIAN ASSISTANT

## 2024-01-18 PROCEDURE — RXMED WILLOW AMBULATORY MEDICATION CHARGE

## 2024-01-18 PROCEDURE — 2500000004 HC RX 250 GENERAL PHARMACY W/ HCPCS (ALT 636 FOR OP/ED): Performed by: PHYSICIAN ASSISTANT

## 2024-01-18 PROCEDURE — G0378 HOSPITAL OBSERVATION PER HR: HCPCS

## 2024-01-18 PROCEDURE — 96366 THER/PROPH/DIAG IV INF ADDON: CPT

## 2024-01-18 PROCEDURE — 85027 COMPLETE CBC AUTOMATED: CPT | Performed by: PHYSICIAN ASSISTANT

## 2024-01-18 PROCEDURE — 96376 TX/PRO/DX INJ SAME DRUG ADON: CPT

## 2024-01-18 RX ORDER — GUAIFENESIN/DEXTROMETHORPHAN 100-10MG/5
5 SYRUP ORAL EVERY 4 HOURS PRN
Qty: 118 ML | Refills: 0 | Status: SHIPPED | OUTPATIENT
Start: 2024-01-18 | End: 2024-04-02 | Stop reason: ALTCHOICE

## 2024-01-18 RX ORDER — METHYLPREDNISOLONE 4 MG/1
TABLET ORAL
Qty: 21 TABLET | Refills: 0 | Status: SHIPPED | OUTPATIENT
Start: 2024-01-18 | End: 2024-01-25

## 2024-01-18 RX ORDER — ALBUTEROL SULFATE 90 UG/1
2 AEROSOL, METERED RESPIRATORY (INHALATION) EVERY 4 HOURS PRN
Qty: 8.5 G | Refills: 0 | Status: SHIPPED | OUTPATIENT
Start: 2024-01-18 | End: 2024-05-14 | Stop reason: ALTCHOICE

## 2024-01-18 RX ADMIN — CEFTRIAXONE 2 G: 2 INJECTION, POWDER, FOR SOLUTION INTRAMUSCULAR; INTRAVENOUS at 09:20

## 2024-01-18 RX ADMIN — Medication: at 07:58

## 2024-01-18 RX ADMIN — GUAIFENESIN 600 MG: 600 TABLET, EXTENDED RELEASE ORAL at 09:31

## 2024-01-18 RX ADMIN — METHYLPREDNISOLONE SODIUM SUCCINATE 40 MG: 40 INJECTION, POWDER, FOR SOLUTION INTRAMUSCULAR; INTRAVENOUS at 11:46

## 2024-01-18 RX ADMIN — IPRATROPIUM BROMIDE AND ALBUTEROL SULFATE 3 ML: 2.5; .5 SOLUTION RESPIRATORY (INHALATION) at 07:27

## 2024-01-18 RX ADMIN — AZITHROMYCIN MONOHYDRATE 500 MG: 500 INJECTION, POWDER, LYOPHILIZED, FOR SOLUTION INTRAVENOUS at 10:09

## 2024-01-18 RX ADMIN — PANTOPRAZOLE SODIUM 40 MG: 40 TABLET, DELAYED RELEASE ORAL at 06:43

## 2024-01-18 RX ADMIN — ENOXAPARIN SODIUM 60 MG: 60 INJECTION SUBCUTANEOUS at 09:30

## 2024-01-18 ASSESSMENT — COGNITIVE AND FUNCTIONAL STATUS - GENERAL
MOBILITY SCORE: 24
MOBILITY SCORE: 24
DAILY ACTIVITIY SCORE: 24
DAILY ACTIVITIY SCORE: 24
MOBILITY SCORE: 24
DAILY ACTIVITIY SCORE: 24

## 2024-01-18 ASSESSMENT — PAIN SCALES - GENERAL: PAINLEVEL_OUTOF10: 0 - NO PAIN

## 2024-01-18 ASSESSMENT — ACTIVITIES OF DAILY LIVING (ADL): LACK_OF_TRANSPORTATION: NO

## 2024-01-18 NOTE — ED NOTES
TYEW was informed by [facility]  that patient needs a primary care physician. CHW  asked the patient if he would be interested  in looking at at list of  physicians to choose from for future services. Patient agreed and expressed if he could have a list of  physicians from [facility] as well. CHW gave the patient a list of  physicians from [facility] and [facility], they are accepting new patients, they are taking his insurance([employer] [place]), and they are in the area where he lives. Patient expressed appreciation.     [facility]  01/18/24 1114       [facility] Oscar [facility]  01/18/24 1117

## 2024-01-18 NOTE — DISCHARGE SUMMARY
Discharge Diagnosis  Pneumonia of both lungs due to infectious organism, unspecified part of lung    Discharge Meds     Your medication list        START taking these medications        Instructions Last Dose Given Next Dose Due   albuterol 90 mcg/actuation inhaler  Commonly known as: ProAir HFA      Inhale 2 puffs every 4 hours if needed for wheezing or shortness of breath. Can use with space       dextromethorphan-guaifenesin  mg/5 mL oral liquid  Commonly known as: Robitussin DM      Take 5 mL by mouth every 4 hours if needed for cough.       methylPREDNISolone 4 mg tablets  Commonly known as: Medrol Dospak      Take as directed on package.                 Where to Get Your Medications        These medications were sent to [facility]  [address] , [address], [facility] [address]      Hours: 8 AM to 6 PM Mon-Fri, 9 AM to 1 PM Saturday Phone: [phone]   albuterol 90 mcg/actuation inhaler  dextromethorphan-guaifenesin  mg/5 mL oral liquid  methylPREDNISolone 4 mg tablets         Test Results Pending At Discharge  Pending Labs       Order Current Status    Blood Culture Preliminary result    Blood Culture Preliminary result            Hospital Course  Rob Carlson is a 48 y.o. male with PMH significant for THU, alcohol use disorder, obesity, venous insufficiency with chronic ulcers who presented to the ED with cough, sore throat and flulike symptoms for the past 4 days. He also reports SOB for the past month and states he can only walk about 60 ft before getting short of breath.  Denies any fever, chills, CP, N/V/D, dizziness. He admits to heavy alcohol use daily. Denies h/o smoking or illicit drug use. Denies h/o DVT/PE, CVA/TIA, acute MI.      ED course: EKG showed NSR, no ischemic ST changes. Negative COVID/Influenza/RSV PCR. Lab work showed CBC without leukocytosis, CMP unremarkable. D-dimer normal. HS troponin normal. Blood cultures pending. CXR showed diffuse interstitial  infiltrates bilaterally. Received IV Solu-medrol and DuoNebs x3 with improvement in his symptoms but continued wheezing heard on exam. Given IV azithromycin 500mg and IV ceftriaxone 1g for suspected pneumonia. Pt unable to maintain adequate oxygen saturations on room air and was admitted to observation.     COVID flu negative.  RSV negative.  Chest x-ray with diffuse interstitial infiltrates bilaterally.  Started on Rocephin and azithromycin.  Pro-Henry normal so we will stop antibiotics.  Bronchopulmonary hygiene DuoNebs as needed antitussives antimucolytic's.  Was on oxygen but titrated down to room air.  Has been on room air all day today.  Urine and blood cultures are still pending.  Plan to discharge with inhaler, prednisone taper, antitussive.  Referral placed for primary care doctor.   Wound nurse consulted to assess legs while he was here.    Pertinent Physical Exam At Time of Discharge  Physical Exam  GENERAL: Alert, NAD, cooperative, obese   SKIN: Warm and dry. Chronic venous ulcers present on anterior shins bilaterally - wounds are dressed and were not removed during exam.  HEENT:  NCAT, PERRLA, EOMI, nonicteric sclera, MMM, neck supple, trachea midline  LUNGS: bl lower lobe expiratory wheezing, slight courseness at bases   CARDS: RRR, no m/g/r appreciated  GI: Soft, NTND, BS+, no rebound, no guarding   MS/Extremities: WWP, 3+ pitting edema bilaterally, distal pulses intact, moves all extremities  NEURO: A&Ox3, grossly nonfocal exam, speech fluent, follows commands, answers questions appropriately  PSYCH: mood and behavior appropriate    Outpatient Follow-Up  No future appointments.    Ref to pcp   Liliam Huang, RITESH-CNP

## 2024-01-18 NOTE — PROGRESS NOTES
Home alone     01/18/24 0959   Current Planned Discharge Disposition   Current Planned Discharge Disposition Home

## 2024-01-18 NOTE — DISCHARGE INSTR - OTHER ORDERS
Your outpatient resources for alcohol dependence are:   Alcoholics Anonymous [phone],    [facility] [phone],   [facility] [phone],   [place] [phone],   [facility] [phone],   [facility] [phone]  [facility] [phone] in [facility] peer recovery services [phone]    [facility] outpatient programs for mental health and chemical dependency services  [address] [place], [place] [id number] [phone],   or  [address] [place], [place] [address] [phone]

## 2024-01-18 NOTE — PROGRESS NOTES
01/18/24 1000   [facility] Disability Status   Are you deaf or do you have serious difficulty hearing? N   Are you blind or do you have serious difficulty seeing, even when wearing glasses? N   Because of a physical, mental, or emotional condition, do you have serious difficulty concentrating, remembering, or making decisions? (5 years old or older) N   Do you have serious difficulty walking or climbing stairs? N   Do you have serious difficulty dressing or bathing? N   Because of a physical, mental, or emotional condition, do you have serious difficulty doing errands alone such as visiting the doctor? N

## 2024-01-18 NOTE — CONSULTS
Wound Care Consult     Visit Date: 1/18/2024      Patient Name: Rob Carlson         MRN: [id number]           YOB: 1975     Reason for Consult: Assessment completed to bilateral lower extremity. Additional supplies left at bedside.         Wound History: Patient reports wounds have been present for approximately 10 years. Patient has been followed by [facility] outpatient wound care in the past.  Currently patient is managing his wound care at home.      Pertinent Labs:   Albumin   Date Value Ref Range Status   01/17/2024 4.1 3.4 - 5.0 g/dL Final       Wound Assessment:  Wound 01/18/24 Venous Ulcer Pretibial Left (Active)   Wound Image     01/18/24 1154       Wound 01/18/24 Venous Ulcer Pretibial Right (Active)   Wound Image    01/18/24 1153       Wound Team Summary Assessment: Notified Liliam Huang CNP of wound care recommendations     BLE-chronic venous statis ulcers   Cleanse with bath wipes or soap and water. Rinse well and pat dry.   Apply no sting skin barrier (cavilon) to adonis wound   Apply single layer of  xeroform dressing  Cover with ABDs, and Kerlix   Secure with Ace Wraps from base of toes to 2 finger width below the knee.   Change every day and as needed.          Wound Team Plan: WOC will follow up next week if still admitted     While in bed patient should only be on one fitted sheet, and one chux. Please, do not use brief while patient is resting in bed. Elevate heels off the bed surface at all times. Turn and reposition at least every 2 hours.     Thank you for this consultations, while inpatient please contact with any questions or changes in condition.       Radha Santamaria RN BSN,[facility],CWOCN  [phone]   1/18/2024  3:07 PM

## 2024-01-18 NOTE — PROGRESS NOTES
Rob Carlson is a 48 y.o. male on day 0 of admission presenting with Pneumonia of both lungs due to infectious organism, unspecified part of lung.    Spoke with patient to discuss his preferences for care. Discussed how patient manages health at home. Lives home alone in apartment on the first floor. Independent in all ADLS. Drives and works. Does have dressings on bilateral lower extremities that he states he used to get treatment for, but now manages himself at home. States there are a couple of open wounds to the right shin, and he changes dressings a couple times per week when wounds are present. Changes them once per week when not. No home O2, dialysis, or assistive devices. Patient denies any problems getting to appointments or obtaining/affording medications.  No additional resources or needs identified.    Plan: admitted for PNA, receiving IV steroids and abx. Discussed importance of abstaining from ETOH use as he is a daily alcohol drinker. Offered resources for ETOH cessation and he denied at this time.  Status: OBS  Payor: [employer]  Disposition: Home  Barrier: iv abx/steroids  ADOD:   1-2 days    She Rosales RN

## 2024-01-18 NOTE — PROGRESS NOTES
Rob Carlson is a 48 y.o. male who presented to ed on 1/17/2024 presenting with Pneumonia of both lungs due to infectious organism, unspecified part of lung.    Subjective    Resting comfortably in bed.  No apparent distress.  On room air.  Reports he is breathing better than yesterday.  Reports chronic leg wounds which she manages himself.  Notes new issue wound to right leg.    Review of systems   Negative except as noted above        Objective   Physical Exam   Physical Exam  GENERAL: Alert, NAD, cooperative, obese   SKIN: Warm and dry. Chronic venous ulcers present on anterior shins bilaterally - wounds are dressed and were not removed during exam.  HEENT:  NCAT, PERRLA, EOMI, nonicteric sclera, MMM, neck supple, trachea midline  LUNGS: bl lower lobe expiratory wheezing, slight courseness at bases   CARDS: RRR, no m/g/r appreciated  GI: Soft, NTND, BS+, no rebound, no guarding   MS/Extremities: WWP, 3+ pitting edema bilaterally, distal pulses intact, moves all extremities  NEURO: A&Ox3, grossly nonfocal exam, speech fluent, follows commands, answers questions appropriately  PSYCH: mood and behavior appropriate    Last Recorded Vitals  BP (!) 145/111 (BP Location: Right arm, Patient Position: Lying)   Pulse 73   Temp 36.4 °C (97.5 °F) (Temporal)   Resp 17   Wt (!) 186 kg (410 lb)   SpO2 100%   Intake/Output last 3 Shifts:    Intake/Output Summary (Last 24 hours) at 1/18/2024 1247  Last data filed at 1/17/2024 1851  Gross per 24 hour   Intake 350 ml   Output --   Net 350 ml     Admission Weight  Weight: (!) 186 kg (410 lb) (01/17/24 1436)  Daily Weight  01/17/24 : (!) 186 kg (410 lb)      Medications   Scheduled medications  azithromycin, 500 mg, intravenous, q24h  cefTRIAXone, 2 g, intravenous, q24h  enoxaparin, 60 mg, subcutaneous, q12h BRENT  guaiFENesin, 600 mg, oral, BID  melatonin, 3 mg, oral, Daily  methylPREDNISolone sodium succinate (PF), 40 mg, intravenous, q12h  pantoprazole, 40 mg, oral, Daily  before breakfast   Or  pantoprazole, 40 mg, intravenous, Daily before breakfast      Continuous medications     PRN medications  PRN medications: acetaminophen **OR** acetaminophen **OR** acetaminophen, dextromethorphan-guaifenesin, docusate sodium, ipratropium-albuteroL, oxygen     Relevant Results  Image Results  ECG 12 Lead  Normal sinus rhythm  Nonspecific T wave abnormality  Abnormal ECG  No previous ECGs available  Please see ED Provider Note for formal interpretation  Confirmed by Washington Almanza ([id number]) on 1/18/2024 2:51:45 AM        Assessment/Plan   Rob Carlson is a 48 y.o. male with PMH significant for THU, alcohol use disorder, obesity, venous insufficiency with chronic ulcers who presented to the ED with cough, sore throat and flulike symptoms for the past 4 days. He also reports SOB for the past month and states he can only walk about 60 ft before getting short of breath.  Denies any fever, chills, CP, N/V/D, dizziness. He admits to heavy alcohol use daily. Denies h/o smoking or illicit drug use. Denies h/o DVT/PE, CVA/TIA, acute MI.      ED course: EKG showed NSR, no ischemic ST changes. Negative COVID/Influenza/RSV PCR. Lab work showed CBC without leukocytosis, CMP unremarkable. D-dimer normal. HS troponin normal. Blood cultures pending. CXR showed diffuse interstitial infiltrates bilaterally. Received IV Solu-medrol and DuoNebs x3 with improvement in his symptoms but continued wheezing heard on exam. Given IV azithromycin 500mg and IV ceftriaxone 1g for suspected pneumonia. Pt unable to maintain adequate oxygen saturations on room air and was admitted to observation.     Principal Problem:    Pneumonia of both lungs due to infectious organism, unspecified part of lung  Active Problems:    Acute respiratory failure with hypoxia (CMS/HCC)    Alcohol use disorder    Pneumonia  Hypoxia  -CBC: no leukocytosis, no acute anemia, no thrombocytopenia  -CMP: no acute electrolyte abnormalities, no acute renal  or liver dysfunction appreciated -HS CHIDI: normal   -BNP: normal  -COVID: negative  -Flu A/B: negative  -RSV: negative  -CXR: Diffuse interstitial infiltrates bilaterally   -continue Rocephin/Azithro to cover CAP  -Procalcitonin pending  -BPH, duonebs, anti-tussive, anti-mucolytics, RT consult  -Titrate Oxygen to maintain O2 > 90%  -Blood culture pending - preliminary results negative  -urine legionella and strep pending  -resume home meds as appropriate     Chronic leg wound  - wound nurse consult      Alcohol use disorder  -Ethanol: normal  -Urine tox screen: neg  Hold off on ciwa at this time     GI prophylaxis  -Protonix     VTE prophylaxis  - Lovenox  - scd/ambulate     DC plan  - DC home when medically stable    Labs/Testing reviewed:   Interdisciplinary team rounding completed with hospitalist, nurse, TCC  NP discussed plan & lab/testing results with Dr. Dyer  --- pending procal and tolerance of room air, potential dc today or tomorrow am   45 min spent on professional & overall care of this patient    RITESH Diggs-CNP

## 2024-01-18 NOTE — PROGRESS NOTES
Pharmacy Medication History Review    Rob Carlson is a 48 y.o. male admitted for Pneumonia of both lungs due to infectious organism, unspecified part of lung. Pharmacy reviewed the patient's opdgs-bt-tsuiirrmo medications and allergies for accuracy.    The list below reflectives the updated PTA list. Please review each medication in order reconciliation for additional clarification and justification.  No medications prior to admission.        The list below reflectives the updated allergy list. Please review each documented allergy for additional clarification and justification.  Allergies  Reviewed by Cindy Bruner LPN on 1/17/2024        Severity Reactions Comments    Bactrim [sulfamethoxazole-trimethoprim] Low Rash             Below are additional concerns with the patient's PTA list.  None    Per patient    Alicia Oleary CPhT

## 2024-01-18 NOTE — PROGRESS NOTES
Transitional Care Coordination Progress Note:  Plan per Medical/Surgical team: treatment of Pn with IV ATB, Duoneb, IV solumedrol, wound care  Status: Observation  Payor source: [employer]  Discharge disposition: Home alone  Potential Barriers: /111  ADOD: 1/19/2024  JANIYA Pacheco RN, BSN Transitional Care Coordinator ED# [phone]      01/18/24 1000   Discharge Planning   Living Arrangements Alone   Support Systems Parent   Assistance Needed ETOH abuse recovery resources   Type of Residence Private residence   Number of Stairs to Enter Residence 0   Number of Stairs Within Residence 0   Home or Post Acute Services Community services   Patient expects to be discharged to: home alone   Does the patient need discharge transport arranged? Yes   RoundTrip coordination needed? Yes   Has discharge transport been arranged? No   Financial Resource Strain   How hard is it for you to pay for the very basics like food, housing, medical care, and heating? Not hard   Housing Stability   In the last 12 months, was there a time when you were not able to pay the mortgage or rent on time? N   In the last 12 months, how many places have you lived? 1   In the last 12 months, was there a time when you did not have a steady place to sleep or slept in a shelter (including now)? N   Transportation Needs   In the past 12 months, has lack of transportation kept you from medical appointments or from getting medications? no   In the past 12 months, has lack of transportation kept you from meetings, work, or from getting things needed for daily living? No

## 2024-01-19 LAB
LEGIONELLA AG UR QL: NEGATIVE
S PNEUM AG UR QL: NEGATIVE

## 2024-01-19 NOTE — CARE PLAN
The patient's goals for the shift include  able to perform adl's this shift.     The clinical goals for the shift include respiratory support, with maintining o2 sat 92% or greater. Discharge teaching completed. Pt. Able to repeate how to take steroid dosepack to this nurse. Denies additional questions or concerns. Discharged home with medications via [employer].

## 2024-01-21 LAB
BACTERIA BLD CULT: NORMAL
BACTERIA BLD CULT: NORMAL

## 2024-01-26 ENCOUNTER — OFFICE VISIT (OUTPATIENT)
Dept: PRIMARY CARE | Facility: CLINIC | Age: 49
End: 2024-01-26
Payer: COMMERCIAL

## 2024-01-26 VITALS
HEART RATE: 70 BPM | OXYGEN SATURATION: 97 % | WEIGHT: 315 LBS | SYSTOLIC BLOOD PRESSURE: 142 MMHG | HEIGHT: 72 IN | DIASTOLIC BLOOD PRESSURE: 94 MMHG | BODY MASS INDEX: 42.66 KG/M2

## 2024-01-26 DIAGNOSIS — R05.2 SUBACUTE COUGH: ICD-10-CM

## 2024-01-26 DIAGNOSIS — J18.9 PNEUMONIA OF BOTH LUNGS DUE TO INFECTIOUS ORGANISM, UNSPECIFIED PART OF LUNG: Primary | ICD-10-CM

## 2024-01-26 DIAGNOSIS — E66.01 CLASS 3 SEVERE OBESITY DUE TO EXCESS CALORIES WITHOUT SERIOUS COMORBIDITY WITH BODY MASS INDEX (BMI) OF 50.0 TO 59.9 IN ADULT (MULTI): ICD-10-CM

## 2024-01-26 DIAGNOSIS — Z11.59 ENCOUNTER FOR HEPATITIS C SCREENING TEST FOR LOW RISK PATIENT: ICD-10-CM

## 2024-01-26 DIAGNOSIS — Z00.00 ENCOUNTER FOR PREVENTIVE HEALTH EXAMINATION: ICD-10-CM

## 2024-01-26 PROBLEM — E66.813 CLASS 3 SEVERE OBESITY DUE TO EXCESS CALORIES WITHOUT SERIOUS COMORBIDITY WITH BODY MASS INDEX (BMI) OF 50.0 TO 59.9 IN ADULT: Status: ACTIVE | Noted: 2024-01-26

## 2024-01-26 PROCEDURE — 1036F TOBACCO NON-USER: CPT | Performed by: STUDENT IN AN ORGANIZED HEALTH CARE EDUCATION/TRAINING PROGRAM

## 2024-01-26 PROCEDURE — 3008F BODY MASS INDEX DOCD: CPT | Performed by: STUDENT IN AN ORGANIZED HEALTH CARE EDUCATION/TRAINING PROGRAM

## 2024-01-26 PROCEDURE — 99204 OFFICE O/P NEW MOD 45 MIN: CPT | Performed by: STUDENT IN AN ORGANIZED HEALTH CARE EDUCATION/TRAINING PROGRAM

## 2024-01-26 ASSESSMENT — PATIENT HEALTH QUESTIONNAIRE - PHQ9
2. FEELING DOWN, DEPRESSED OR HOPELESS: NOT AT ALL
SUM OF ALL RESPONSES TO PHQ9 QUESTIONS 1 AND 2: 0
1. LITTLE INTEREST OR PLEASURE IN DOING THINGS: NOT AT ALL

## 2024-01-26 NOTE — PROGRESS NOTES
Rob Carlson is a 48 y.o. year old male presenting for Pneumonia       HPI:  Patient went to the emergency room on January 17, 9 days ago, with complaints of shortness of breath, cough, and weakness.  He was diagnosed with pneumonia at the bottoms of both of his lungs as well as hypoxia, requiring oxygen therapy.  He was admitted overnight for treatment until he was able to come off of oxygen.  He was given antibiotics and discharged home the following day on the 18th.  He has finished the antibiotic as well as the steroid at this point.  He is using the albuterol as needed still as well as some Robitussin.  He states he is feeling much better and not getting as winded as he was before.  He still has a lingering cough, but it is not productive and he has not had any new fever.  He has no other concerns today.    Of note, he has not had a primary care doctor consistently for the last 30 years and he is willing to come back another day for a physical when he is feeling well.  ROS:   All pertinent positive symptoms are included in history of present illness.    All other systems have been reviewed and are negative and noncontributory to this patient's current ailments.    Current Outpatient Medications   Medication Sig Dispense Refill    albuterol (ProAir HFA) 90 mcg/actuation inhaler Inhale 2 puffs every 4 hours if needed for wheezing or shortness of breath. Can use with space 8.5 g 0    dextromethorphan-guaifenesin (Robitussin DM)  mg/5 mL oral liquid Take 5 mL by mouth every 4 hours if needed for cough. 118 mL 0     No current facility-administered medications for this visit.       OBJECTIVE  Visit Vitals  BP (!) 142/94   Pulse 70   Ht 1.829 m (6')   Wt (!) 182 kg (401 lb)   SpO2 97%   BMI 54.39 kg/m²   Smoking Status Never   BSA 3.04 m²        Physical Exam:  GENERAL: Alert, oriented, pleasant, in no acute distress  HEENT: Head normocephalic, atraumatic  CV: Heart with regular rate and rhythm, normal  S1/S2, no murmurs; normal peripheral pulses- radial and dorsalis pedis palpable  LUNGS: Some wheezing in the right lung base, otherwise clear without rhonchi or rales; good respiratory effort, no increased work of breathing  ABDOMEN: soft, non-tender, non-distended, no masses appreciated; +BS in all four quadrants  EXTREMITIES: no edema, no cyanosis  PSYCH: Appropriate mood and affect  SKIN: No rashes or lesions appreciated    Assessment/Plan   Diagnoses and all orders for this visit:  Pneumonia of both lungs due to infectious organism, unspecified part of lung  Subacute cough  ER provider note reviewed as well as the chest x-ray from that encounter  He is doing much better at this point, has finished the antibiotics as well as the methylprednisolone pack  Continue to monitor and he can continue the albuterol as needed, but he has not really needed it lately.  If he has any new or worsening symptoms, he should call the office and we will repeat her chest x-ray at that time    I did order fasting blood work for him to get prior to his next appointment where we will do his complete physical exam and health maintenance  Encounter for preventive health examination  -     CBC; Future  -     Comprehensive Metabolic Panel; Future  -     Hemoglobin A1C; Future  -     Lipid Panel; Future  -     TSH with reflex to Free T4 if abnormal; Future  Encounter for hepatitis C screening test for low risk patient  -     Hepatitis C Antibody; Future

## 2024-02-26 ENCOUNTER — OFFICE VISIT (OUTPATIENT)
Dept: PRIMARY CARE | Facility: CLINIC | Age: 49
End: 2024-02-26
Payer: COMMERCIAL

## 2024-02-26 ENCOUNTER — LAB (OUTPATIENT)
Dept: LAB | Facility: LAB | Age: 49
End: 2024-02-26
Payer: COMMERCIAL

## 2024-02-26 VITALS
SYSTOLIC BLOOD PRESSURE: 146 MMHG | HEIGHT: 72 IN | HEART RATE: 80 BPM | WEIGHT: 315 LBS | DIASTOLIC BLOOD PRESSURE: 86 MMHG | BODY MASS INDEX: 42.66 KG/M2

## 2024-02-26 DIAGNOSIS — Z12.11 ENCOUNTER FOR SCREENING FOR MALIGNANT NEOPLASM OF COLON: ICD-10-CM

## 2024-02-26 DIAGNOSIS — Z00.00 ENCOUNTER FOR PREVENTIVE HEALTH EXAMINATION: Primary | ICD-10-CM

## 2024-02-26 DIAGNOSIS — E66.01 CLASS 3 SEVERE OBESITY DUE TO EXCESS CALORIES WITHOUT SERIOUS COMORBIDITY WITH BODY MASS INDEX (BMI) OF 50.0 TO 59.9 IN ADULT (MULTI): ICD-10-CM

## 2024-02-26 DIAGNOSIS — Z11.59 ENCOUNTER FOR HEPATITIS C SCREENING TEST FOR LOW RISK PATIENT: ICD-10-CM

## 2024-02-26 DIAGNOSIS — Z00.00 ENCOUNTER FOR PREVENTIVE HEALTH EXAMINATION: ICD-10-CM

## 2024-02-26 DIAGNOSIS — I10 PRIMARY HYPERTENSION: ICD-10-CM

## 2024-02-26 LAB
ALBUMIN SERPL BCP-MCNC: 4.1 G/DL (ref 3.4–5)
ALP SERPL-CCNC: 65 U/L (ref 33–120)
ALT SERPL W P-5'-P-CCNC: 23 U/L (ref 10–52)
ANION GAP SERPL CALC-SCNC: 10 MMOL/L (ref 10–20)
AST SERPL W P-5'-P-CCNC: 18 U/L (ref 9–39)
BILIRUB SERPL-MCNC: 0.9 MG/DL (ref 0–1.2)
BUN SERPL-MCNC: 11 MG/DL (ref 6–23)
CALCIUM SERPL-MCNC: 9.4 MG/DL (ref 8.6–10.3)
CHLORIDE SERPL-SCNC: 104 MMOL/L (ref 98–107)
CHOLEST SERPL-MCNC: 150 MG/DL (ref 0–199)
CHOLESTEROL/HDL RATIO: 3.8
CO2 SERPL-SCNC: 28 MMOL/L (ref 21–32)
CREAT SERPL-MCNC: 0.89 MG/DL (ref 0.5–1.3)
EGFRCR SERPLBLD CKD-EPI 2021: >90 ML/MIN/1.73M*2
ERYTHROCYTE [DISTWIDTH] IN BLOOD BY AUTOMATED COUNT: 12.7 % (ref 11.5–14.5)
GLUCOSE SERPL-MCNC: 91 MG/DL (ref 74–99)
HCT VFR BLD AUTO: 48.8 % (ref 41–52)
HDLC SERPL-MCNC: 39.9 MG/DL
HGB BLD-MCNC: 15 G/DL (ref 13.5–17.5)
LDLC SERPL CALC-MCNC: 80 MG/DL
MCH RBC QN AUTO: 28 PG (ref 26–34)
MCHC RBC AUTO-ENTMCNC: 30.7 G/DL (ref 32–36)
MCV RBC AUTO: 91 FL (ref 80–100)
NON HDL CHOLESTEROL: 110 MG/DL (ref 0–149)
NRBC BLD-RTO: 0 /100 WBCS (ref 0–0)
PLATELET # BLD AUTO: 325 X10*3/UL (ref 150–450)
POTASSIUM SERPL-SCNC: 4.1 MMOL/L (ref 3.5–5.3)
PROT SERPL-MCNC: 6.9 G/DL (ref 6.4–8.2)
RBC # BLD AUTO: 5.36 X10*6/UL (ref 4.5–5.9)
SODIUM SERPL-SCNC: 138 MMOL/L (ref 136–145)
TRIGL SERPL-MCNC: 153 MG/DL (ref 0–149)
TSH SERPL-ACNC: 1.21 MIU/L (ref 0.44–3.98)
VLDL: 31 MG/DL (ref 0–40)
WBC # BLD AUTO: 6.8 X10*3/UL (ref 4.4–11.3)

## 2024-02-26 PROCEDURE — 80061 LIPID PANEL: CPT

## 2024-02-26 PROCEDURE — 86803 HEPATITIS C AB TEST: CPT

## 2024-02-26 PROCEDURE — 85027 COMPLETE CBC AUTOMATED: CPT

## 2024-02-26 PROCEDURE — 3008F BODY MASS INDEX DOCD: CPT | Performed by: STUDENT IN AN ORGANIZED HEALTH CARE EDUCATION/TRAINING PROGRAM

## 2024-02-26 PROCEDURE — 99214 OFFICE O/P EST MOD 30 MIN: CPT | Performed by: STUDENT IN AN ORGANIZED HEALTH CARE EDUCATION/TRAINING PROGRAM

## 2024-02-26 PROCEDURE — 80053 COMPREHEN METABOLIC PANEL: CPT

## 2024-02-26 PROCEDURE — 36415 COLL VENOUS BLD VENIPUNCTURE: CPT

## 2024-02-26 PROCEDURE — 99396 PREV VISIT EST AGE 40-64: CPT | Performed by: STUDENT IN AN ORGANIZED HEALTH CARE EDUCATION/TRAINING PROGRAM

## 2024-02-26 PROCEDURE — 3077F SYST BP >= 140 MM HG: CPT | Performed by: STUDENT IN AN ORGANIZED HEALTH CARE EDUCATION/TRAINING PROGRAM

## 2024-02-26 PROCEDURE — 3079F DIAST BP 80-89 MM HG: CPT | Performed by: STUDENT IN AN ORGANIZED HEALTH CARE EDUCATION/TRAINING PROGRAM

## 2024-02-26 PROCEDURE — 84443 ASSAY THYROID STIM HORMONE: CPT

## 2024-02-26 PROCEDURE — 1036F TOBACCO NON-USER: CPT | Performed by: STUDENT IN AN ORGANIZED HEALTH CARE EDUCATION/TRAINING PROGRAM

## 2024-02-26 PROCEDURE — 83036 HEMOGLOBIN GLYCOSYLATED A1C: CPT

## 2024-02-26 RX ORDER — AMLODIPINE BESYLATE 5 MG/1
5 TABLET ORAL DAILY
Qty: 90 TABLET | Refills: 0 | Status: SHIPPED | OUTPATIENT
Start: 2024-02-26 | End: 2024-02-26 | Stop reason: SDUPTHER

## 2024-02-26 RX ORDER — AMLODIPINE BESYLATE 5 MG/1
5 TABLET ORAL DAILY
Qty: 90 TABLET | Refills: 0 | Status: SHIPPED | OUTPATIENT
Start: 2024-02-26 | End: 2024-04-02 | Stop reason: SDUPTHER

## 2024-02-26 ASSESSMENT — PATIENT HEALTH QUESTIONNAIRE - PHQ9
SUM OF ALL RESPONSES TO PHQ9 QUESTIONS 1 AND 2: 0
2. FEELING DOWN, DEPRESSED OR HOPELESS: NOT AT ALL
1. LITTLE INTEREST OR PLEASURE IN DOING THINGS: NOT AT ALL

## 2024-02-26 NOTE — PROGRESS NOTES
Subjective   Patient ID: Rob Carlson is a 48 y.o. male who presents for Annual Exam.    HPI  Diet is unhealthy.  Does not exercise regularly.  Due for colon cancer screening.  Vaccines are not up to date; they are due for: Tdap  Dental exam is not up to date.  Vision exam is up to date.  Patient is fasting for labs today.  Social: Tobacco use- denies      Alcohol use-admits to being a heavy daily drinker, but gave up alcohol for Lent, so he currently is not drinking any alcohol.  He states he will probably go back to drinking alcohol though after [place]t is over.    He states that his blood pressures at home have been higher than what we got today in the office and he understands that that is high and abnormal.  He denies any chest pain, shortness of breath, headaches, weakness, numbness, tingling, lower extremity edema.    Review of Systems  All pertinent positive symptoms are included in the history of present illness.    All other systems have been reviewed and are negative and noncontributory to this patient's current ailments.     Social History     Tobacco Use    Smoking status: Never    Smokeless tobacco: Never   Substance Use Topics    Alcohol use: Yes     Alcohol/week: 6.0 standard drinks of alcohol     Types: 6 Cans of beer per week     Comment: 5th on fri and sat      History reviewed. No pertinent surgical history.   Allergies   Allergen Reactions    Bactrim [Sulfamethoxazole-Trimethoprim] Rash        Current Outpatient Medications   Medication Sig Dispense Refill    albuterol (ProAir HFA) 90 mcg/actuation inhaler Inhale 2 puffs every 4 hours if needed for wheezing or shortness of breath. Can use with space 8.5 g 0    dextromethorphan-guaifenesin (Robitussin DM)  mg/5 mL oral liquid Take 5 mL by mouth every 4 hours if needed for cough. 118 mL 0    amLODIPine (Norvasc) 5 mg tablet Take 1 tablet (5 mg) by mouth once daily. 90 tablet 0     No current facility-administered medications for this visit.         Patient Active Problem List   Diagnosis    Cough    Nonspecific syndrome suggestive of viral illness    Pneumonia of both lungs due to infectious organism, unspecified part of lung    Acute respiratory failure with hypoxia (CMS/HCC)    Alcohol use disorder    Class 3 severe obesity due to excess calories without serious comorbidity with body mass index (BMI) of 50.0 to 59.9 in adult (CMS/HCC)      Immunization History   Administered Date(s) Administered    Moderna SARS-CoV-2 Vaccination 10/02/2021, 10/29/2021       Objective   VITALS  /86   Pulse 80   Ht 1.829 m (6')   Wt (!) 180 kg (397 lb)   BMI 53.84 kg/m²      Physical Exam  CONSTITUTIONAL - well nourished, well developed, looks like stated age, in no acute distress, not ill-appearing, and not tired appearing  SKIN - normal skin color and pigmentation, normal skin turgor without rash, lesions, or nodules visualized  HEAD - no trauma, normocephalic  EYES - pupils are equal and reactive to light, extraocular muscles are intact, and normal external exam  ENT - TM's intact, no injection, no signs of infection, uvula midline, normal tongue movement and throat normal, no exudate, nasal passage without discharge and patent  NECK - supple without rigidity, no neck mass was observed, no thyromegaly or thyroid nodules  CHEST - clear to auscultation, no wheezing, no crackles and no rales, good effort  CARDIAC - regular rate and regular rhythm, no skipped beats, no murmur  ABDOMEN - no organomegaly, soft, nontender, nondistended, normal bowel sounds, no guarding/rebound/rigidity, negative McBurney sign and negative Lewis sign  EXTREMITIES - no edema, no deformities  NEUROLOGICAL - normal gait, normal balance, normal motor, no ataxia, DTRs equal and symmetrical; alert, oriented and no focal signs  PSYCHIATRIC - alert, pleasant and cordial, age-appropriate  IMMUNOLOGIC - no cervical lymphadenopathy      Assessment/Plan   Diagnoses and all orders for this  visit:  Encounter for preventive health examination  A complete history and physical was performed today  He does have a family history in his father of colon cancer, so he does need a screening colonoscopy at this time as he has not had any colon cancer screening up to this point  He did fast today to get his labs done that were ordered for him after his last appointment  Encounter for screening for malignant neoplasm of colon  -     Colonoscopy Screening; High Risk Patient; first degree relative; Future  Class 3 severe obesity due to excess calories without serious comorbidity with body mass index (BMI) of 50.0 to 59.9 in adult (CMS/[facility])  He eats mostly fast food or food see orders from restaurants and if he does eat food prepared in his home, and is usually preprepared foods.  He has a lot of lifestyle work that he needs to begin doing in order to help his hypertension and try to get some weight off.  I also recommend he start getting some more physical activity outside of his work  Primary hypertension  Blood pressure has consistently been elevated inside the office and at home, so it is appropriate to start medication at this time for hypertension.  Amlodipine was sent to his pharmacy.  Side effects were discussed and he should call the office if he notices any side effects to the medication  -     amLODIPine (Norvasc) 5 mg tablet; Take 1 tablet (5 mg) by mouth once daily.    He should follow-up in 4 weeks

## 2024-02-27 DIAGNOSIS — E11.9 TYPE 2 DIABETES MELLITUS WITHOUT COMPLICATION, WITHOUT LONG-TERM CURRENT USE OF INSULIN (MULTI): Primary | ICD-10-CM

## 2024-02-27 LAB
EST. AVERAGE GLUCOSE BLD GHB EST-MCNC: 140 MG/DL
HBA1C MFR BLD: 6.5 %
HCV AB SER QL: NONREACTIVE

## 2024-02-27 RX ORDER — METFORMIN HYDROCHLORIDE 500 MG/1
TABLET ORAL
Qty: 173 TABLET | Refills: 0 | Status: SHIPPED | OUTPATIENT
Start: 2024-02-27 | End: 2024-05-15 | Stop reason: SDUPTHER

## 2024-04-02 ENCOUNTER — OFFICE VISIT (OUTPATIENT)
Dept: PRIMARY CARE | Facility: CLINIC | Age: 49
End: 2024-04-02
Payer: COMMERCIAL

## 2024-04-02 VITALS
WEIGHT: 315 LBS | HEART RATE: 87 BPM | SYSTOLIC BLOOD PRESSURE: 130 MMHG | DIASTOLIC BLOOD PRESSURE: 90 MMHG | HEIGHT: 72 IN | BODY MASS INDEX: 42.66 KG/M2

## 2024-04-02 DIAGNOSIS — E11.9 TYPE 2 DIABETES MELLITUS WITHOUT COMPLICATION, WITHOUT LONG-TERM CURRENT USE OF INSULIN (MULTI): ICD-10-CM

## 2024-04-02 DIAGNOSIS — I10 PRIMARY HYPERTENSION: Primary | ICD-10-CM

## 2024-04-02 PROCEDURE — 1036F TOBACCO NON-USER: CPT | Performed by: STUDENT IN AN ORGANIZED HEALTH CARE EDUCATION/TRAINING PROGRAM

## 2024-04-02 PROCEDURE — 3075F SYST BP GE 130 - 139MM HG: CPT | Performed by: STUDENT IN AN ORGANIZED HEALTH CARE EDUCATION/TRAINING PROGRAM

## 2024-04-02 PROCEDURE — 3044F HG A1C LEVEL LT 7.0%: CPT | Performed by: STUDENT IN AN ORGANIZED HEALTH CARE EDUCATION/TRAINING PROGRAM

## 2024-04-02 PROCEDURE — 3048F LDL-C <100 MG/DL: CPT | Performed by: STUDENT IN AN ORGANIZED HEALTH CARE EDUCATION/TRAINING PROGRAM

## 2024-04-02 PROCEDURE — 4010F ACE/ARB THERAPY RXD/TAKEN: CPT | Performed by: STUDENT IN AN ORGANIZED HEALTH CARE EDUCATION/TRAINING PROGRAM

## 2024-04-02 PROCEDURE — 3080F DIAST BP >= 90 MM HG: CPT | Performed by: STUDENT IN AN ORGANIZED HEALTH CARE EDUCATION/TRAINING PROGRAM

## 2024-04-02 PROCEDURE — 99214 OFFICE O/P EST MOD 30 MIN: CPT | Performed by: STUDENT IN AN ORGANIZED HEALTH CARE EDUCATION/TRAINING PROGRAM

## 2024-04-02 PROCEDURE — 3008F BODY MASS INDEX DOCD: CPT | Performed by: STUDENT IN AN ORGANIZED HEALTH CARE EDUCATION/TRAINING PROGRAM

## 2024-04-02 RX ORDER — AMLODIPINE BESYLATE 5 MG/1
5 TABLET ORAL DAILY
Qty: 90 TABLET | Refills: 0 | Status: SHIPPED | OUTPATIENT
Start: 2024-04-02 | End: 2024-07-01

## 2024-04-02 RX ORDER — OLMESARTAN MEDOXOMIL 20 MG/1
20 TABLET ORAL DAILY
Qty: 90 TABLET | Refills: 0 | Status: SHIPPED | OUTPATIENT
Start: 2024-04-02 | End: 2024-05-30 | Stop reason: SDUPTHER

## 2024-04-02 ASSESSMENT — PATIENT HEALTH QUESTIONNAIRE - PHQ9
1. LITTLE INTEREST OR PLEASURE IN DOING THINGS: NOT AT ALL
2. FEELING DOWN, DEPRESSED OR HOPELESS: NOT AT ALL
SUM OF ALL RESPONSES TO PHQ9 QUESTIONS 1 AND 2: 0

## 2024-04-02 NOTE — PROGRESS NOTES
Rob Carlson is a 48 y.o. year old male presenting for Follow-up       HPI:  Rob presents today to discuss his high blood pressure, about 6 weeks since starting amlodipine 5mg. He does not check his blood pressure at home. Initial check in the office today was quite elevated, but did improve on recheck. He has noted some increased lower extremity swelling since starting the amlodipine, but he can manage this fine by wrapping his legs.  He has not been able to make noticeable changes to his diet, but he has started trying to walk more throughout the day. His job has started an initiative to try to reach 10,000 steps per day, and he is participating in that initiative. He did walk about 11,000 steps yesterday.     ROS:   All pertinent positive symptoms are included in history of present illness.    All other systems have been reviewed and are negative and noncontributory to this patient's current ailments.    Current Outpatient Medications   Medication Sig Dispense Refill    albuterol (ProAir HFA) 90 mcg/actuation inhaler Inhale 2 puffs every 4 hours if needed for wheezing or shortness of breath. Can use with space 8.5 g 0    metFORMIN (Glucophage) 500 mg tablet Take 1 tablet (500 mg) by mouth once daily in the evening. Take with meals for 7 days, THEN 2 tablets (1,000 mg) once daily in the evening. Take with meals. 173 tablet 0    amLODIPine (Norvasc) 5 mg tablet Take 1 tablet (5 mg) by mouth once daily. 90 tablet 0    olmesartan (Benicar) 20 mg tablet Take 1 tablet (20 mg) by mouth once daily. 90 tablet 0     No current facility-administered medications for this visit.       OBJECTIVE  Visit Vitals  /90   Pulse 87   Ht 1.829 m (6')   Wt (!) 184 kg (406 lb)   BMI 55.06 kg/m²   Smoking Status Never   BSA 3.06 m²        Physical Exam:  GENERAL: Alert, oriented, pleasant, in no acute distress  HEENT: Head normocephalic, atraumatic  CV: Heart with regular rate and rhythm, normal S1/S2, no murmurs  LUNGS: CTAB  without wheezing, rhonchi or rales; good respiratory effort, no increased work of breathing  ABDOMEN: soft, non-tender, non-distended, no masses appreciated  EXTREMITIES: no edema, no cyanosis  PSYCH: Appropriate mood and affect  SKIN: No rashes or lesions appreciated    Assessment/Plan     Hypertension  Blood pressure with marginal improvement with amlodipine, will add olmesartan 20mg to his medication regimen.   Continue with amlodipine 5mg as well.   Advised the patient to check his blood pressure at home.  Continue to work on improving the diet. Also encouraged the patient to continue to work on his exercise levels - aiming for 10,000 steps is a great goal.    2. Type 2 diabetes  Only taking Metformin 500mg daily, did not titrate up due to side effects  Please continue working on dietary changes  Will recheck A1c next month    Follow up in 4-6 weeks

## 2024-05-14 ENCOUNTER — LAB (OUTPATIENT)
Dept: LAB | Facility: LAB | Age: 49
End: 2024-05-14
Payer: COMMERCIAL

## 2024-05-14 ENCOUNTER — OFFICE VISIT (OUTPATIENT)
Dept: PRIMARY CARE | Facility: CLINIC | Age: 49
End: 2024-05-14
Payer: COMMERCIAL

## 2024-05-14 VITALS
SYSTOLIC BLOOD PRESSURE: 132 MMHG | WEIGHT: 315 LBS | HEART RATE: 76 BPM | HEIGHT: 72 IN | BODY MASS INDEX: 42.66 KG/M2 | DIASTOLIC BLOOD PRESSURE: 88 MMHG

## 2024-05-14 DIAGNOSIS — R06.83 SNORING: ICD-10-CM

## 2024-05-14 DIAGNOSIS — E66.01 CLASS 3 SEVERE OBESITY DUE TO EXCESS CALORIES WITHOUT SERIOUS COMORBIDITY WITH BODY MASS INDEX (BMI) OF 50.0 TO 59.9 IN ADULT (MULTI): ICD-10-CM

## 2024-05-14 DIAGNOSIS — E11.9 TYPE 2 DIABETES MELLITUS WITHOUT COMPLICATION, WITHOUT LONG-TERM CURRENT USE OF INSULIN (MULTI): Primary | ICD-10-CM

## 2024-05-14 DIAGNOSIS — E11.9 TYPE 2 DIABETES MELLITUS WITHOUT COMPLICATION, WITHOUT LONG-TERM CURRENT USE OF INSULIN (MULTI): ICD-10-CM

## 2024-05-14 DIAGNOSIS — I10 PRIMARY HYPERTENSION: ICD-10-CM

## 2024-05-14 LAB
ALBUMIN SERPL BCP-MCNC: 4.6 G/DL (ref 3.4–5)
ALP SERPL-CCNC: 75 U/L (ref 33–120)
ALT SERPL W P-5'-P-CCNC: 21 U/L (ref 10–52)
ANION GAP SERPL CALC-SCNC: 13 MMOL/L (ref 10–20)
AST SERPL W P-5'-P-CCNC: 21 U/L (ref 9–39)
BILIRUB SERPL-MCNC: 0.9 MG/DL (ref 0–1.2)
BUN SERPL-MCNC: 17 MG/DL (ref 6–23)
CALCIUM SERPL-MCNC: 9.7 MG/DL (ref 8.6–10.3)
CHLORIDE SERPL-SCNC: 104 MMOL/L (ref 98–107)
CO2 SERPL-SCNC: 26 MMOL/L (ref 21–32)
CREAT SERPL-MCNC: 0.75 MG/DL (ref 0.5–1.3)
CREAT UR-MCNC: 17.3 MG/DL (ref 20–370)
EGFRCR SERPLBLD CKD-EPI 2021: >90 ML/MIN/1.73M*2
GLUCOSE SERPL-MCNC: 92 MG/DL (ref 74–99)
MICROALBUMIN UR-MCNC: <7 MG/L
MICROALBUMIN/CREAT UR: ABNORMAL MG/G{CREAT}
POTASSIUM SERPL-SCNC: 4.3 MMOL/L (ref 3.5–5.3)
PROT SERPL-MCNC: 7.5 G/DL (ref 6.4–8.2)
SODIUM SERPL-SCNC: 139 MMOL/L (ref 136–145)

## 2024-05-14 PROCEDURE — 3079F DIAST BP 80-89 MM HG: CPT | Performed by: STUDENT IN AN ORGANIZED HEALTH CARE EDUCATION/TRAINING PROGRAM

## 2024-05-14 PROCEDURE — 36415 COLL VENOUS BLD VENIPUNCTURE: CPT

## 2024-05-14 PROCEDURE — 99214 OFFICE O/P EST MOD 30 MIN: CPT | Performed by: STUDENT IN AN ORGANIZED HEALTH CARE EDUCATION/TRAINING PROGRAM

## 2024-05-14 PROCEDURE — 3044F HG A1C LEVEL LT 7.0%: CPT | Performed by: STUDENT IN AN ORGANIZED HEALTH CARE EDUCATION/TRAINING PROGRAM

## 2024-05-14 PROCEDURE — 3008F BODY MASS INDEX DOCD: CPT | Performed by: STUDENT IN AN ORGANIZED HEALTH CARE EDUCATION/TRAINING PROGRAM

## 2024-05-14 PROCEDURE — 82570 ASSAY OF URINE CREATININE: CPT

## 2024-05-14 PROCEDURE — 3048F LDL-C <100 MG/DL: CPT | Performed by: STUDENT IN AN ORGANIZED HEALTH CARE EDUCATION/TRAINING PROGRAM

## 2024-05-14 PROCEDURE — 83036 HEMOGLOBIN GLYCOSYLATED A1C: CPT

## 2024-05-14 PROCEDURE — 3075F SYST BP GE 130 - 139MM HG: CPT | Performed by: STUDENT IN AN ORGANIZED HEALTH CARE EDUCATION/TRAINING PROGRAM

## 2024-05-14 PROCEDURE — 82043 UR ALBUMIN QUANTITATIVE: CPT

## 2024-05-14 PROCEDURE — 1036F TOBACCO NON-USER: CPT | Performed by: STUDENT IN AN ORGANIZED HEALTH CARE EDUCATION/TRAINING PROGRAM

## 2024-05-14 PROCEDURE — 80053 COMPREHEN METABOLIC PANEL: CPT

## 2024-05-14 PROCEDURE — 4010F ACE/ARB THERAPY RXD/TAKEN: CPT | Performed by: STUDENT IN AN ORGANIZED HEALTH CARE EDUCATION/TRAINING PROGRAM

## 2024-05-14 ASSESSMENT — PATIENT HEALTH QUESTIONNAIRE - PHQ9
1. LITTLE INTEREST OR PLEASURE IN DOING THINGS: NOT AT ALL
SUM OF ALL RESPONSES TO PHQ9 QUESTIONS 1 AND 2: 0
2. FEELING DOWN, DEPRESSED OR HOPELESS: NOT AT ALL

## 2024-05-14 NOTE — ASSESSMENT & PLAN NOTE
Difficult to determine accuracy of BP readings in office today  As mentioned previously, it would be best to review home blood pressure readings to assess how well these medications are working for you  I would like to have you monitor and record blood pressures at home   Blood pressure goal should be below 130/80, ideally 120/80  If the blood pressure is too high or too low, we need to consider making adjustments to your antihypertensive therapy  Please share home readings with us in 2 weeks    Continue amlodipine and olmesartan as currently prescribed for now

## 2024-05-14 NOTE — ASSESSMENT & PLAN NOTE
As mentioned above, you are not interested in any other approaches to weight loss besides diet and exercise  Please make sure that you are in a caloric deficit daily and attempt to get at least 150 minutes of moderate intensity exercise every week

## 2024-05-14 NOTE — ASSESSMENT & PLAN NOTE
It is slightly disappointing to hear that you were not able to increase metformin to 1000 mg daily for fear of getting side effects even though you did not experience side effects  We will check your A1c again today  Continue metformin 500 mg daily for now

## 2024-05-14 NOTE — PROGRESS NOTES
Subjective   Patient ID: Rob Carlson is a 48 y.o. male who presents for Follow-up.    Past Medical, Surgical, and Family History reviewed and updated in chart.    Reviewed all medications by prescribing practitioner or clinical pharmacist (such as prescriptions, OTCs, herbal therapies and supplements) and documented in the medical record.    HPI  1.  Hypertension  /100 on intake and then 132/88 on repeat  These readings may not be the most reliable since we had to use a thigh cuff  I asked the patient for his home BP readings and he said that he has not been taking them  At his last visit, we added olmesartan to the amlodipine which he has been compliant with  He has been tolerating medication well  Denies cardiopulmonary symptoms    2.  T2DM  A1c 6.5% in February  Started metformin at that time, was instructed to increase dose to 1000 mg daily as tolerated  States today that he is only been taking 500 mg daily because he heard that metformin causes side effects and he does not want to be going to the bathroom throughout the day while at work  He denies experiencing any side effects however    3.  Snoring  His Fitbit has been recording periods of apnea throughout the night  He has been told that he snores throughout the night as well  He will wake up most mornings with a sore throat and feels tired throughout the day despite getting close to 8 hours of sleep  He is interested in a home sleep study but states that he does not want this to be sent to his apartment for fear of it getting stolen and he would prefer that the sleep study be sent to this office so that he can pick it up at a later date    4.  Obesity  Admits that he has not made very many changes to his diet other than buying groceries and trying to eat out less  Still getting 10,000 steps daily at work  He is not interested in speaking to a dietitian  He is not interested in starting weight loss medication  He is not interested in a referral to  bariatric surgery    Review of Systems  All pertinent positive symptoms are included in the history of present illness.    All other systems have been reviewed and are negative and noncontributory to this patient's current ailments.    History reviewed. No pertinent past medical history.  History reviewed. No pertinent surgical history.  Social History     Tobacco Use    Smoking status: Never    Smokeless tobacco: Never   Substance Use Topics    Alcohol use: Yes     Alcohol/week: 6.0 standard drinks of alcohol     Types: 6 Cans of beer per week     Comment: 5th on fri and sat    Drug use: Never     No family history on file.  Immunization History   Administered Date(s) Administered    Moderna SARS-CoV-2 Vaccination 10/02/2021, 10/29/2021     Current Outpatient Medications   Medication Instructions    amLODIPine (NORVASC) 5 mg, oral, Daily    metFORMIN (Glucophage) 500 mg tablet Take 1 tablet (500 mg) by mouth once daily in the evening. Take with meals for 7 days, THEN 2 tablets (1,000 mg) once daily in the evening. Take with meals.    olmesartan (BENICAR) 20 mg, oral, Daily     Allergies   Allergen Reactions    Bactrim [Sulfamethoxazole-Trimethoprim] Rash       Objective   Vitals:    05/14/24 0727 05/14/24 0804   BP: (!) 142/100 132/88   Pulse: 76    Weight: (!) 180 kg (396 lb)    Height: 1.829 m (6')      Body mass index is 53.71 kg/m².    BP Readings from Last 3 Encounters:   05/14/24 132/88   04/02/24 130/90   02/26/24 146/86      Wt Readings from Last 3 Encounters:   05/14/24 (!) 180 kg (396 lb)   04/02/24 (!) 184 kg (406 lb)   02/26/24 (!) 180 kg (397 lb)        No visits with results within 1 Month(s) from this visit.   Latest known visit with results is:   Lab on 02/26/2024   Component Date Value    WBC 02/26/2024 6.8     nRBC 02/26/2024 0.0     RBC 02/26/2024 5.36     Hemoglobin 02/26/2024 15.0     Hematocrit 02/26/2024 48.8     MCV 02/26/2024 91     MCH 02/26/2024 28.0     MCHC 02/26/2024 30.7 (L)     RDW  02/26/2024 12.7     Platelets 02/26/2024 325     Glucose 02/26/2024 91     Sodium 02/26/2024 138     Potassium 02/26/2024 4.1     Chloride 02/26/2024 104     Bicarbonate 02/26/2024 28     Anion Gap 02/26/2024 10     Urea Nitrogen 02/26/2024 11     Creatinine 02/26/2024 0.89     eGFR 02/26/2024 >90     Calcium 02/26/2024 9.4     Albumin 02/26/2024 4.1     Alkaline Phosphatase 02/26/2024 65     Total Protein 02/26/2024 6.9     AST 02/26/2024 18     Bilirubin, Total 02/26/2024 0.9     ALT 02/26/2024 23     Hemoglobin A1C 02/26/2024 6.5 (H)     Estimated Average Glucose 02/26/2024 140     Cholesterol 02/26/2024 150     HDL-Cholesterol 02/26/2024 39.9     Cholesterol/HDL Ratio 02/26/2024 3.8     LDL Calculated 02/26/2024 80     VLDL 02/26/2024 31     Triglycerides 02/26/2024 153 (H)     Non HDL Cholesterol 02/26/2024 110     Thyroid Stimulating Horm* 02/26/2024 1.21     Hepatitis C AB 02/26/2024 Nonreactive      Physical Exam  CONSTITUTIONAL - well nourished, well developed, looks like stated age, in no acute distress, not ill-appearing, and not tired appearing  SKIN - normal skin color and pigmentation, normal skin turgor without rash, lesions, or nodules visualized  HEAD - no trauma, normocephalic  CHEST - clear to auscultation, no wheezing, no crackles and no rales, good effort  CARDIAC - regular rate and regular rhythm, no skipped beats, no murmur  EXTREMITIES - no edema, no deformities  NEUROLOGICAL - normal gait, normal balance, normal motor  PSYCHIATRIC - alert, pleasant and cordial, age-appropriate     Assessment/Plan   Problem List Items Addressed This Visit       Class 3 severe obesity due to excess calories without serious comorbidity with body mass index (BMI) of 50.0 to 59.9 in adult (Multi) - Primary     As mentioned above, you are not interested in any other approaches to weight loss besides diet and exercise  Please make sure that you are in a caloric deficit daily and attempt to get at least 150 minutes  of moderate intensity exercise every week         Type 2 diabetes mellitus without complication, without long-term current use of insulin (Multi)     It is slightly disappointing to hear that you were not able to increase metformin to 1000 mg daily for fear of getting side effects even though you did not experience side effects  We will check your A1c again today  Continue metformin 500 mg daily for now         Snoring     We will have a home sleep study kit sent to our office and call you when it is delivered         Primary hypertension     Difficult to determine accuracy of BP readings in office today  As mentioned previously, it would be best to review home blood pressure readings to assess how well these medications are working for you  I would like to have you monitor and record blood pressures at home   Blood pressure goal should be below 130/80, ideally 120/80  If the blood pressure is too high or too low, we need to consider making adjustments to your antihypertensive therapy  Please share home readings with us in 2 weeks    Continue amlodipine and olmesartan as currently prescribed for now

## 2024-05-15 DIAGNOSIS — E11.9 TYPE 2 DIABETES MELLITUS WITHOUT COMPLICATION, WITHOUT LONG-TERM CURRENT USE OF INSULIN (MULTI): ICD-10-CM

## 2024-05-15 LAB
EST. AVERAGE GLUCOSE BLD GHB EST-MCNC: 117 MG/DL
HBA1C MFR BLD: 5.7 %

## 2024-05-15 RX ORDER — METFORMIN HYDROCHLORIDE 500 MG/1
500 TABLET ORAL
Qty: 90 TABLET | Refills: 1 | Status: SHIPPED | OUTPATIENT
Start: 2024-05-15 | End: 2024-11-11

## 2024-05-30 ENCOUNTER — OFFICE VISIT (OUTPATIENT)
Dept: PRIMARY CARE | Facility: CLINIC | Age: 49
End: 2024-05-30
Payer: COMMERCIAL

## 2024-05-30 VITALS
HEIGHT: 72 IN | HEART RATE: 69 BPM | WEIGHT: 315 LBS | BODY MASS INDEX: 42.66 KG/M2 | SYSTOLIC BLOOD PRESSURE: 150 MMHG | DIASTOLIC BLOOD PRESSURE: 88 MMHG

## 2024-05-30 DIAGNOSIS — I10 PRIMARY HYPERTENSION: Primary | ICD-10-CM

## 2024-05-30 PROCEDURE — 99214 OFFICE O/P EST MOD 30 MIN: CPT | Performed by: STUDENT IN AN ORGANIZED HEALTH CARE EDUCATION/TRAINING PROGRAM

## 2024-05-30 PROCEDURE — 3008F BODY MASS INDEX DOCD: CPT | Performed by: STUDENT IN AN ORGANIZED HEALTH CARE EDUCATION/TRAINING PROGRAM

## 2024-05-30 PROCEDURE — 3062F POS MACROALBUMINURIA REV: CPT | Performed by: STUDENT IN AN ORGANIZED HEALTH CARE EDUCATION/TRAINING PROGRAM

## 2024-05-30 PROCEDURE — 3044F HG A1C LEVEL LT 7.0%: CPT | Performed by: STUDENT IN AN ORGANIZED HEALTH CARE EDUCATION/TRAINING PROGRAM

## 2024-05-30 PROCEDURE — 4010F ACE/ARB THERAPY RXD/TAKEN: CPT | Performed by: STUDENT IN AN ORGANIZED HEALTH CARE EDUCATION/TRAINING PROGRAM

## 2024-05-30 PROCEDURE — 3079F DIAST BP 80-89 MM HG: CPT | Performed by: STUDENT IN AN ORGANIZED HEALTH CARE EDUCATION/TRAINING PROGRAM

## 2024-05-30 PROCEDURE — 3048F LDL-C <100 MG/DL: CPT | Performed by: STUDENT IN AN ORGANIZED HEALTH CARE EDUCATION/TRAINING PROGRAM

## 2024-05-30 PROCEDURE — 1036F TOBACCO NON-USER: CPT | Performed by: STUDENT IN AN ORGANIZED HEALTH CARE EDUCATION/TRAINING PROGRAM

## 2024-05-30 PROCEDURE — 3077F SYST BP >= 140 MM HG: CPT | Performed by: STUDENT IN AN ORGANIZED HEALTH CARE EDUCATION/TRAINING PROGRAM

## 2024-05-30 RX ORDER — OLMESARTAN MEDOXOMIL 40 MG/1
40 TABLET ORAL DAILY
Qty: 90 TABLET | Refills: 0 | Status: SHIPPED | OUTPATIENT
Start: 2024-05-30 | End: 2024-08-28

## 2024-05-30 ASSESSMENT — PATIENT HEALTH QUESTIONNAIRE - PHQ9
2. FEELING DOWN, DEPRESSED OR HOPELESS: NOT AT ALL
1. LITTLE INTEREST OR PLEASURE IN DOING THINGS: NOT AT ALL
SUM OF ALL RESPONSES TO PHQ9 QUESTIONS 1 AND 2: 0

## 2024-05-30 NOTE — PROGRESS NOTES
Rob Carlson is a 48 y.o. year old male presenting for Follow-up       HPI:  He is currently taking olmesartan 20 mg and amlodipine 5 mg daily for blood pressure control.  He does present with home blood pressure readings today and his average still seems to be above goal.  His blood pressure readings are up and down with some good readings and some pretty poor readings.  He states overall he feels okay even when his blood pressure is high.  He denies chest pain, shortness of breath, headaches, weakness, numbness, tingling, lower extremity edema.  He has been tolerating his medications without side effects that he has noticed.    ROS:   All pertinent positive symptoms are included in history of present illness.    All other systems have been reviewed and are negative and noncontributory to this patient's current ailments.    Current Outpatient Medications   Medication Sig Dispense Refill    amLODIPine (Norvasc) 5 mg tablet Take 1 tablet (5 mg) by mouth once daily. 90 tablet 0    metFORMIN (Glucophage) 500 mg tablet Take 1 tablet (500 mg) by mouth once daily in the evening. Take with meals. 90 tablet 1    olmesartan (BENIcar) 40 mg tablet Take 1 tablet (40 mg) by mouth once daily. 90 tablet 0     No current facility-administered medications for this visit.       OBJECTIVE  Visit Vitals  /90   Pulse 69   Ht 1.829 m (6')   Wt (!) 184 kg (406 lb)   BMI 55.06 kg/m²   Smoking Status Never   BSA 3.06 m²        Physical Exam:  GENERAL: Alert, oriented, pleasant, in no acute distress  HEENT: Head normocephalic, atraumatic  CV: Heart with regular rate and rhythm, normal S1/S2, no murmurs; normal peripheral pulses- radial and dorsalis pedis palpable  LUNGS: CTAB without wheezing, rhonchi or rales; good respiratory effort, no increased work of breathing  EXTREMITIES: no edema, no cyanosis  PSYCH: Appropriate mood and affect  SKIN: No rashes or lesions appreciated    Assessment/Plan   Diagnoses and all orders for this  visit:  Primary hypertension  Overall remains above goal on amlodipine 5 mg and olmesartan 20 mg, so I recommend increasing the olmesartan to 40 mg at this time.  He can use up his current medication by taking 2.  If he notices his blood pressure is up-and-down quite frequently, I would recommend splitting these medications up and taking 1 in the morning and 1 at night.  He should continue checking his blood pressure at home with a goal blood pressure less than 130/80  -     olmesartan (BENIcar) 40 mg tablet; Take 1 tablet (40 mg) by mouth once daily.    He will essentially follow-up in 3 months with labs before

## 2024-08-26 ENCOUNTER — APPOINTMENT (OUTPATIENT)
Dept: PRIMARY CARE | Facility: CLINIC | Age: 49
End: 2024-08-26
Payer: COMMERCIAL

## 2024-10-04 ENCOUNTER — APPOINTMENT (OUTPATIENT)
Dept: PRIMARY CARE | Facility: CLINIC | Age: 49
End: 2024-10-04
Payer: COMMERCIAL

## 2024-10-04 VITALS
HEART RATE: 77 BPM | DIASTOLIC BLOOD PRESSURE: 100 MMHG | BODY MASS INDEX: 42.66 KG/M2 | WEIGHT: 315 LBS | SYSTOLIC BLOOD PRESSURE: 138 MMHG | HEIGHT: 72 IN

## 2024-10-04 DIAGNOSIS — J06.9 UPPER RESPIRATORY TRACT INFECTION, UNSPECIFIED TYPE: Primary | ICD-10-CM

## 2024-10-04 DIAGNOSIS — I10 PRIMARY HYPERTENSION: ICD-10-CM

## 2024-10-04 PROCEDURE — 3048F LDL-C <100 MG/DL: CPT | Performed by: STUDENT IN AN ORGANIZED HEALTH CARE EDUCATION/TRAINING PROGRAM

## 2024-10-04 PROCEDURE — 3044F HG A1C LEVEL LT 7.0%: CPT | Performed by: STUDENT IN AN ORGANIZED HEALTH CARE EDUCATION/TRAINING PROGRAM

## 2024-10-04 PROCEDURE — 3075F SYST BP GE 130 - 139MM HG: CPT | Performed by: STUDENT IN AN ORGANIZED HEALTH CARE EDUCATION/TRAINING PROGRAM

## 2024-10-04 PROCEDURE — 3080F DIAST BP >= 90 MM HG: CPT | Performed by: STUDENT IN AN ORGANIZED HEALTH CARE EDUCATION/TRAINING PROGRAM

## 2024-10-04 PROCEDURE — 3008F BODY MASS INDEX DOCD: CPT | Performed by: STUDENT IN AN ORGANIZED HEALTH CARE EDUCATION/TRAINING PROGRAM

## 2024-10-04 PROCEDURE — 1036F TOBACCO NON-USER: CPT | Performed by: STUDENT IN AN ORGANIZED HEALTH CARE EDUCATION/TRAINING PROGRAM

## 2024-10-04 PROCEDURE — 99214 OFFICE O/P EST MOD 30 MIN: CPT | Performed by: STUDENT IN AN ORGANIZED HEALTH CARE EDUCATION/TRAINING PROGRAM

## 2024-10-04 PROCEDURE — 3062F POS MACROALBUMINURIA REV: CPT | Performed by: STUDENT IN AN ORGANIZED HEALTH CARE EDUCATION/TRAINING PROGRAM

## 2024-10-04 RX ORDER — METHYLPREDNISOLONE 4 MG/1
TABLET ORAL
Qty: 21 TABLET | Refills: 0 | Status: SHIPPED | OUTPATIENT
Start: 2024-10-04 | End: 2024-10-11

## 2024-10-04 ASSESSMENT — PATIENT HEALTH QUESTIONNAIRE - PHQ9
SUM OF ALL RESPONSES TO PHQ9 QUESTIONS 1 AND 2: 0
1. LITTLE INTEREST OR PLEASURE IN DOING THINGS: NOT AT ALL
2. FEELING DOWN, DEPRESSED OR HOPELESS: NOT AT ALL

## 2024-10-04 NOTE — PROGRESS NOTES
Subjective   Patient ID: Rob Carlson is a 49 y.o. male who presents for Follow-up.    HPI   Annual he is here for blood pressure follow-up after increase his olmesartan from 20 to 40 mg tablet from the last visit, 3 months ago.  Patient reported then since June he did not take his medication due to the upset of the  and decided to not take his blood pressure medication.  Patient had cold in the past week, with occasionally cough, wheezing, but declines fever chest pain, shortness of breath, abdominal pain, chills, nausea, vomiting.  Review of Systems  Are negative  Objective   BP (!) 138/100   Pulse 77   Ht 1.829 m (6')   Wt (!) 186 kg (410 lb)   BMI 55.61 kg/m²     Physical Exam  General: Alert and oriented. Appears well-nourished and in no acute distress, overweight  Eyes: PERRLA. EOMI.  Head/neck: Normocephalic. Supple.  Lymphatics: No cervical lymphadenopathy.  Respiratory/Thorax: Clear to auscultation bilaterally.  Positive for wheezing.   Cardiovascular: Regular rate and rhythm. No murmurs.  Gastrointestinal: Soft, nontender, nondistended. +BS   Musculoskeletal: ROM intact. No joint swelling. Normal strength   Extremities: Warm and well perfused. No peripheral edema.  Neurological: No gross neurologic deficits.   Psychological: Appropriate mood and affect.   Skin: No visible rashes or lesions.   Assessment/Plan   He is 49 years old man with past medical history of being overweight, hypertension who presented today to the office for follow-up on his blood pressure.    # Hypertension  Blood pressure is not at goal.  Patient discontinued his blood pressure medication on his own but is willing to take it.  -Advised to continue with olmesartan 40 mg tablet daily  -Continue with amlodipine 5 mg tablet  -Advised to reduce salty food and increase veggie and fruit in the diet  -Advised to increase exercise.  Losing weight will improve his blood pressure and health referral.  -Patient was advised to  keep a blood pressure log and bring it to the next visit.  -Please do the labs a week before the next visit so we can discuss the result at the next appointment.    #Upper respiratory tract infection, unspecified type  Patient had a cold for the past week and now is having cough and wheezing.  Patient was diagnosed with pneumonia at the beginning of the year and at that time he got albuterol inhaler.  Patient was advised to use albuterol inhaler every 4-6 hours 2 puffs as needed for wheezing.  -     methylPREDNISolone (Medrol Dospak) 4 mg tablets; Take as directed on package.  -Let us know if the symptoms get worse such as spiking fever, increased shortness of breath, chest pain, excessive cough or go to the closest emergency department.      I discussed my plan with my attending, Dr.Shahrimanyan Ansley Barbosa. MD  Family medicine resident  PGY 3

## 2024-11-15 ENCOUNTER — APPOINTMENT (OUTPATIENT)
Dept: PRIMARY CARE | Facility: CLINIC | Age: 49
End: 2024-11-15
Payer: COMMERCIAL

## 2024-12-18 ENCOUNTER — ANCILLARY PROCEDURE (OUTPATIENT)
Dept: URGENT CARE | Age: 49
End: 2024-12-18
Payer: COMMERCIAL

## 2024-12-18 ENCOUNTER — OFFICE VISIT (OUTPATIENT)
Dept: URGENT CARE | Age: 49
End: 2024-12-18
Payer: COMMERCIAL

## 2024-12-18 VITALS
HEART RATE: 61 BPM | DIASTOLIC BLOOD PRESSURE: 98 MMHG | SYSTOLIC BLOOD PRESSURE: 173 MMHG | OXYGEN SATURATION: 96 % | BODY MASS INDEX: 56.01 KG/M2 | WEIGHT: 315 LBS

## 2024-12-18 DIAGNOSIS — R05.1 ACUTE COUGH: ICD-10-CM

## 2024-12-18 DIAGNOSIS — J40 BRONCHITIS: Primary | ICD-10-CM

## 2024-12-18 PROCEDURE — 71046 X-RAY EXAM CHEST 2 VIEWS: CPT | Performed by: NURSE PRACTITIONER

## 2024-12-18 PROCEDURE — 3077F SYST BP >= 140 MM HG: CPT | Performed by: NURSE PRACTITIONER

## 2024-12-18 PROCEDURE — 99203 OFFICE O/P NEW LOW 30 MIN: CPT | Performed by: NURSE PRACTITIONER

## 2024-12-18 PROCEDURE — 1036F TOBACCO NON-USER: CPT | Performed by: NURSE PRACTITIONER

## 2024-12-18 PROCEDURE — 3062F POS MACROALBUMINURIA REV: CPT | Performed by: NURSE PRACTITIONER

## 2024-12-18 PROCEDURE — 3080F DIAST BP >= 90 MM HG: CPT | Performed by: NURSE PRACTITIONER

## 2024-12-18 PROCEDURE — 3044F HG A1C LEVEL LT 7.0%: CPT | Performed by: NURSE PRACTITIONER

## 2024-12-18 PROCEDURE — 94640 AIRWAY INHALATION TREATMENT: CPT | Performed by: NURSE PRACTITIONER

## 2024-12-18 PROCEDURE — 3048F LDL-C <100 MG/DL: CPT | Performed by: NURSE PRACTITIONER

## 2024-12-18 RX ORDER — IPRATROPIUM BROMIDE AND ALBUTEROL SULFATE 2.5; .5 MG/3ML; MG/3ML
3 SOLUTION RESPIRATORY (INHALATION) ONCE
Status: COMPLETED | OUTPATIENT
Start: 2024-12-18 | End: 2024-12-18

## 2024-12-18 RX ORDER — INHALER, ASSIST DEVICES
SPACER (EA) MISCELLANEOUS
Qty: 1 EACH | Refills: 0 | Status: SHIPPED | OUTPATIENT
Start: 2024-12-18 | End: 2025-12-18

## 2024-12-18 RX ORDER — ALBUTEROL SULFATE 90 UG/1
2 INHALANT RESPIRATORY (INHALATION) EVERY 4 HOURS PRN
Qty: 8.5 G | Refills: 0 | Status: SHIPPED | OUTPATIENT
Start: 2024-12-18 | End: 2025-12-18

## 2024-12-18 RX ORDER — DOXYCYCLINE 100 MG/1
100 CAPSULE ORAL 2 TIMES DAILY
Qty: 20 CAPSULE | Refills: 0 | Status: SHIPPED | OUTPATIENT
Start: 2024-12-18 | End: 2024-12-28

## 2024-12-18 NOTE — PROGRESS NOTES
Subjective   Patient ID: oRb Carlson is a 49 y.o. male. They present today with a chief complaint of Illness (Wheezing x6days).    History of Present Illness  48 yo male coming in for cough, wheezing, and shortness of breath. He states this has been going on for the last 6 days. He denies any fevers or chills. He states at the beginning of this year he had pneumonia and he is trying not to get to that point again.     Past Medical History  Allergies as of 12/18/2024 - Reviewed 12/18/2024   Allergen Reaction Noted    Bactrim [sulfamethoxazole-trimethoprim] Rash 01/17/2024       (Not in a hospital admission)       History reviewed. No pertinent past medical history.    History reviewed. No pertinent surgical history.     reports that he has never smoked. He has never used smokeless tobacco. He reports current alcohol use of about 6.0 standard drinks of alcohol per week. He reports that he does not use drugs.    Review of Systems  Review of Systems:  General: No weight loss, fatigue, anorexia, insomnia, fever, chills.  ENT: No pharyngitis, dry mouth, nasal congestion, ear pain  Cardiac: No chest pain, palpitations, syncope, near syncope.  Pulmonary:  Positive intermittent shortness of breath, positive cough, no hemoptysis  Heme/lymph: No swollen glands, fever, bleeding  Musculoskeletal: No limb pain, joint pain, joint swelling.  Skin: No rashes  Neuro: No numbness, tingling, headaches                                 Objective    Vitals:    12/18/24 1404   BP: (!) 173/98   Pulse: 61   SpO2: 96%   Weight: (!) 187 kg (413 lb)     No LMP for male patient.    Physical Exam  Physical Exam:  General: Vital noted, no distress. Afebrile  EENT: Eyes unremarkable, Pupils PERRLA, EOMs intact. TMs unremarkable. Posterior oropharynx unremarkable. Uvula in the midline and non-edematous. No PTA. No retropharyngeal mass. No Ryan's angina.  Cardiac: Regular rate and rhythm, no murmur  Pulmonary: Lungs with inspiratory and  expiratory wheezing bilaterally with good aeration. No adventitious breath sounds.  Skin: No rashes  Neuro: No focal neurologic deficits    Procedures    Point of Care Test & Imaging Results from this visit  No results found for this visit on 12/18/24.   XR chest 2 views    Result Date: 12/18/2024  Interpreted By:  Rodrigo Dasilva, STUDY: XR CHEST 2 VIEWS   INDICATION: Signs/Symptoms:cough.   COMPARISON: January 17, 2024   ACCESSION NUMBER(S): [id number]   ORDERING CLINICIAN: RASHEEDA ANDREWS   FINDINGS: No consolidation, effusion, edema, or pneumothorax. Heart size upper limits of normal unchanged.       No evidence of acute intrathoracic abnormality.   Signed by: Rodrigo Dasilva 12/18/2024 2:45 PM Dictation workstation:   [id number]     Diagnostic study results (if any) were reviewed by JOHN Yi.    Assessment/Plan   Allergies, medications, history, and pertinent labs/EKGs/Imaging reviewed by JOHN Yi.     Medical Decision Making  Testing:  chest XR  Treatment: Duoneb given in clinic. Albuterol and doxycycline prescribed  Differential: 1) uri , 2) bronchitis , 3) pneumonia   Plan: Patient will follow up with the PCP in the next 2-3 days. Return for any worsening symptoms or go to the ER for further evaluation. Patient understands return precautions and discharge insturctions.  Impression:   1) bronchitis      Orders and Diagnoses  Diagnoses and all orders for this visit:  Bronchitis  -     albuterol (ProAir HFA) 90 mcg/actuation inhaler; Inhale 2 puffs every 4 hours if needed for wheezing or shortness of breath.  -     inhalational spacing device (Flexichamber) inhaler; Use as instructed  -     doxycycline (Vibramycin) 100 mg capsule; Take 1 capsule (100 mg) by mouth 2 times a day for 10 days. Take with at least 8 ounces (large glass) of water, do not lie down for 30 minutes after  Acute cough  -     ipratropium-albuteroL (Duo-Neb) 0.5-2.5 mg/3 mL nebulizer solution 3 mL  -     XR chest  2 views; Future      Medical Admin Record  Administrations This Visit       ipratropium-albuteroL (Duo-Neb) 0.5-2.5 mg/3 mL nebulizer solution 3 mL       Admin Date  12/18/2024 Action  Given Dose  3 mL Route  nebulization Documented By  Anders Tucker MA                    Patient disposition: Home    Electronically signed by JOHN Yi  3:27 PM